# Patient Record
Sex: FEMALE | Race: WHITE | Employment: STUDENT | URBAN - METROPOLITAN AREA
[De-identification: names, ages, dates, MRNs, and addresses within clinical notes are randomized per-mention and may not be internally consistent; named-entity substitution may affect disease eponyms.]

---
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
## 2025-01-02 PROCEDURE — S9374 HIT HYDRA 1 LITER DIEM: HCPCS

## 2025-01-03 ENCOUNTER — HOSPITAL ENCOUNTER (EMERGENCY)
Facility: CLINIC | Age: 20
Discharge: HOME | End: 2025-01-03
Attending: EMERGENCY MEDICINE | Admitting: EMERGENCY MEDICINE
Payer: COMMERCIAL

## 2025-01-03 ENCOUNTER — HOSPITAL ENCOUNTER (EMERGENCY)
Facility: CLINIC | Age: 20
Discharge: HOME | End: 2025-01-03
Payer: COMMERCIAL

## 2025-01-03 VITALS
TEMPERATURE: 97.9 F | OXYGEN SATURATION: 100 % | SYSTOLIC BLOOD PRESSURE: 145 MMHG | RESPIRATION RATE: 17 BRPM | DIASTOLIC BLOOD PRESSURE: 96 MMHG | HEART RATE: 88 BPM

## 2025-01-03 DIAGNOSIS — J02.0 STREP PHARYNGITIS: ICD-10-CM

## 2025-01-03 PROCEDURE — 99284 EMERGENCY DEPT VISIT MOD MDM: CPT | Performed by: EMERGENCY MEDICINE

## 2025-01-03 PROCEDURE — 250N000011 HC RX IP 250 OP 636: Performed by: EMERGENCY MEDICINE

## 2025-01-03 PROCEDURE — 99283 EMERGENCY DEPT VISIT LOW MDM: CPT | Performed by: EMERGENCY MEDICINE

## 2025-01-03 PROCEDURE — 96372 THER/PROPH/DIAG INJ SC/IM: CPT | Performed by: EMERGENCY MEDICINE

## 2025-01-03 RX ADMIN — PENICILLIN G BENZATHINE 1.2 MILLION UNITS: 1200000 INJECTION, SUSPENSION INTRAMUSCULAR at 22:45

## 2025-01-03 ASSESSMENT — ACTIVITIES OF DAILY LIVING (ADL): ADLS_ACUITY_SCORE: 48

## 2025-01-04 NOTE — ED TRIAGE NOTES
Patient is positive for strep throat. Patient came here from the [facility] because she is looking to get a shot of Penicillin G and the [place] location had it on back order. Per chart review, she was also advised to reach out to her PCP because the physician did not think pcn would be preferable for the strep. Patient was prescribed oral medication at that time.

## 2025-01-04 NOTE — ED PROVIDER NOTES
[facility] EMERGENCY DEPARTMENT ([facility])    1/03/25      ED PROVIDER NOTE    History     Chief Complaint   Patient presents with    Medication Request     Patient is positive for strep and wanting to get the penicillin IM injection, the last hospital she was at did not have it as it was on backorder      The history is provided by the patient and medical records.     Jaimie Ortiz is a 19 year old female with a history of Crohn's disease who tested positive for group A strep earlier this afternoon.  She was initially prescribed oral amoxicillin but has been unable to tolerate this in the past so she presents to the ED requesting IM penicillin injection.  Patient's GI doctor recommends IM penicillin, manage to go to the Emergency Department for this as there would not be a way to have this done in a timely manner.  Patient was at another facility but they do not have this available.  No other symptoms or concerns.    Past Medical History  Past Medical History:   Diagnosis Date    Allergy to mold spores     12/9/13 skin tests pos. only for M/W only    Anxiety     Crohn's disease (H)     Diagnostic skin and sensitization tests 12/9/13 skin tests pos. only for M/W only    HSP (Henoch Schonlein purpura) (H) 12/2007    resolved    Other and unspecified noninfectious gastroenteritis and colitis(558.9) 05/20/2009    Seasonal allergic rhinitis      Past Surgical History:   Procedure Laterality Date    CAPSULE/PILL CAM ENDOSCOPY N/A 02/03/2021    Procedure: VIDEO CAPSULE ENDOSCOPY;  Surgeon: Marily Lane MD;  [facility]: [facility]     COLONOSCOPY N/A 12/16/2020    Procedure: COLONOSCOPY, WITH POLYPECTOMY AND BIOPSY;  Surgeon: Marily Lane MD;  [facility]: [facility]     COLONOSCOPY N/A 07/13/2021    Procedure: COLONOSCOPY, WITH POLYPECTOMY AND BIOPSY;  Surgeon: Marily Lane MD;  [facility]     COLONOSCOPY N/A 08/01/2022    Procedure: COLONOSCOPY, WITH POLYPECTOMY AND BIOPSY;  Surgeon:  Marily Lane MD;  [facility]: [facility]     ESOPHAGOSCOPY, GASTROSCOPY, DUODENOSCOPY (EGD), COMBINED N/A 12/16/2020    Procedure: upper endoscopy, WITH BIOPSY;  Surgeon: Marily Lane MD;  [facility]: [facility]     ESOPHAGOSCOPY, GASTROSCOPY, DUODENOSCOPY (EGD), COMBINED N/A 07/13/2021    Procedure: ESOPHAGOGASTRODUODENOSCOPY, WITH BIOPSY;  Surgeon: Marily Lane MD;  [facility]     ESOPHAGOSCOPY, GASTROSCOPY, DUODENOSCOPY (EGD), COMBINED N/A 08/01/2022    Procedure: ESOPHAGOGASTRODUODENOSCOPY, WITH BIOPSY;  Surgeon: Marily Lane MD;  [facility]     ORTHOPEDIC SURGERY  2010    Trigger thumb    TONSILLECTOMY, ADENOIDECTOMY, COMBINED Bilateral 12/19/2019    Procedure: Bilateral TONSILLECTOMY, possible adenoidectomy;  Surgeon: Markus Rojas MD;  Location: [facility] OR     acetaminophen (TYLENOL) 325 MG tablet  amoxicillin (AMOXIL) 500 MG capsule  BANOPHEN 25 MG capsule  COMPOUNDED NON-CONTROLLED SUBSTANCE (CMPD RX) - PHARMACY TO MIX COMPOUNDED MEDICATION  dicyclomine (BENTYL) 10 MG capsule  diphenhydrAMINE (BENADRYL) 25 MG tablet  Emergency Supply Kit, PIV,  EPINEPHrine (ANY BX GENERIC EQUIV) 0.3 MG/0.3ML injection 2-pack  famotidine (PEPCID) 20 MG tablet  fluocinonide (LIDEX) 0.05 % external ointment  FLUoxetine (PROZAC) 10 MG capsule  FLUoxetine (PROZAC) 40 MG capsule  gentamicin (GARAMYCIN) 0.1 % external ointment  HUMIRA, 2 PEN, 40 MG/0.4ML pen kit  hydrOXYzine HCl (ATARAX) 10 MG tablet  mometasone (ELOCON) 0.1 % external ointment  norethindrone-ethinyl estradiol (MICROGESTIN 1.5/30) 1.5-30 MG-MCG tablet  ondansetron (ZOFRAN ODT) 4 MG ODT tab  pantoprazole (PROTONIX) 40 MG EC tablet  senna (SENNA LAX) 8.6 MG tablet  sodium chloride 0.9 % in 1,000 mL via gravity infusion  sodium chloride, PF, 0.9% PF flush  tacrolimus (PROTOPIC) 0.1 % external ointment  VTAMA 1 % CREA      Allergies   Allergen Reactions    Infliximab Anaphylaxis     Pt will still be getting it. Give pre-meds  and give over 2 hours      Gluten Meal      Throat gets sores and swelling     Family History  Family History   Problem Relation Age of Onset    Eye Disorder Mother     Brain Cancer Father     Breast Cancer Maternal Grandmother     Hypertension Maternal Grandfather     Hypertension Paternal Grandmother     Crohn's Disease Other     Ulcerative Colitis Other     Colon Polyps Other     Colon Cancer Other      Social History   Social History     Tobacco Use    Smoking status: Never     Passive exposure: Never    Smokeless tobacco: Never   Vaping Use    Vaping status: Never Used   Substance Use Topics    Alcohol use: No    Drug use: No      Past medical history, past surgical history, medications, allergies, family history, and social history were reviewed with the patient. No additional pertinent items.     A complete review of systems was performed with pertinent positives and negatives noted in the HPI, and all other systems negative.    Physical Exam   BP: (!) 145/96  Pulse: 88  Temp: 97.9  F (36.6  C)  Resp: 18  SpO2: 100 %    Physical Exam  Vitals and nursing note reviewed.   Constitutional:       Appearance: Normal appearance.   HENT:      Head: Normocephalic and atraumatic.   Pulmonary:      Effort: Pulmonary effort is normal.   Skin:     General: Skin is warm and dry.   Neurological:      General: No focal deficit present.      Mental Status: She is alert and oriented to person, place, and time.   Psychiatric:         Mood and Affect: Mood normal.         Behavior: Behavior normal.           ED Course, Procedures, & Data      Procedures                Results for orders placed or performed in visit on 01/03/25   Streptococcus A Rapid Screen w/Reflex to PCR - Clinic Collect     Status: Normal    Specimen: Throat; Swab   Result Value Ref Range    Group A Strep antigen Negative Negative   Group A Streptococcus PCR Throat Swab     Status: Abnormal    Specimen: Throat; Swab   Result Value Ref Range    Group A strep  by PCR Detected (A) Not Detected    Narrative    The Xpert Xpress Strep A test, performed on the [employer], is a rapid, qualitative in vitro diagnostic test for the detection of Streptococcus pyogenes (Group A ß-hemolytic Streptococcus, Strep A) in throat swab specimens from patients with signs and symptoms of pharyngitis. The Xpert Xpress Strep A test can be used as an aid in the diagnosis of Group A Streptococcal pharyngitis. The assay is not intended to monitor treatment for Group A Streptococcus infections. The Xpert Xpress Strep A test utilizes an automated real-time polymerase chain reaction (PCR) to detect Streptococcus pyogenes DNA.     Medications - No data to display  Labs Ordered and Resulted from Time of ED Arrival to Time of ED Departure - No data to display  No orders to display              Assessment & Plan    This is a 19-year-old female who presents for medication.  Patient was diagnosed with strep earlier today.  She has not been able to tolerate p.o. treatment for this in the past.  She is followed by GI for Crohn's disease and they recommend IM penicillin.  Patient was unable to get this done at other facilities.  Patient was given dose here in the Emergency Department.  Will discharge with return precautions.    I have reviewed the nursing notes. I have reviewed the findings, diagnosis, plan and need for follow up with the patient.    New Prescriptions    No medications on file       Final diagnoses:   None     I, Hector Ramirez, am serving as a trained medical scribe to document services personally performed by Montana Garcia DO based on the provider's statements to me on January 3, 2025.  This document has been checked and approved by the attending provider.    I, Montana Garcia DO, was physically present and have reviewed and verified the accuracy of this note documented by Hector Ramirez medical scribe.      Montana Garcia DO  [facility] EMERGENCY  DEPARTMENT  1/3/2025     Montana Garcia,   01/04/25 0252

## 2025-01-06 ENCOUNTER — HOME CARE VISIT (OUTPATIENT)
Dept: HOME HEALTH SERVICES | Facility: HOME HEALTH | Age: 20
End: 2025-01-06
Payer: COMMERCIAL

## 2025-01-06 VITALS
SYSTOLIC BLOOD PRESSURE: 108 MMHG | RESPIRATION RATE: 16 BRPM | HEART RATE: 85 BPM | DIASTOLIC BLOOD PRESSURE: 64 MMHG | TEMPERATURE: 99.6 F | OXYGEN SATURATION: 99 %

## 2025-01-06 PROCEDURE — 99601 HOME NFS VISIT <2 HRS: CPT

## 2025-01-06 NOTE — PROGRESS NOTES
Nursing Visit Note:  Nurse visit today for PIV start for Jaimie ARMAS Angel.     present during visit today: Not Applicable.    patient alert and oriented. Recent strep diagnosis. Received IM injected antibiotic in ER last week. Now having productive cough and intermittent fevers. Cough and fever starting yesterday. T max  101 yesterday per patient. Taking Tylenol. Temp 99.6F at visit today. lung sounds clear to auscultation although slightly diminished in bases. patient reports some shortness of breath. RR and SPO2 WNL. denies chest pain. Intermittently coughing up green sputum. instructed patient to follow up in ER or urgent care if continued fevers and productive cough. Also encouraged to take home Covid test. patient verbalizes understanding. IV fluid infusing via new ultrasound guided PIV started today. Encouraged to call with questions/concern/updates.      Neymar Bledsoe RN 1/6/2025

## 2025-01-07 ENCOUNTER — TELEPHONE (OUTPATIENT)
Dept: FAMILY MEDICINE | Facility: CLINIC | Age: 20
End: 2025-01-07

## 2025-01-07 ENCOUNTER — APPOINTMENT (OUTPATIENT)
Dept: GENERAL RADIOLOGY | Facility: CLINIC | Age: 20
End: 2025-01-07
Attending: EMERGENCY MEDICINE
Payer: COMMERCIAL

## 2025-01-07 ENCOUNTER — HOSPITAL ENCOUNTER (EMERGENCY)
Facility: CLINIC | Age: 20
Discharge: HOME | End: 2025-01-07
Attending: EMERGENCY MEDICINE
Payer: COMMERCIAL

## 2025-01-07 VITALS
TEMPERATURE: 98.1 F | RESPIRATION RATE: 18 BRPM | HEIGHT: 62 IN | DIASTOLIC BLOOD PRESSURE: 77 MMHG | WEIGHT: 110 LBS | BODY MASS INDEX: 20.24 KG/M2 | OXYGEN SATURATION: 100 % | HEART RATE: 91 BPM | SYSTOLIC BLOOD PRESSURE: 109 MMHG

## 2025-01-07 DIAGNOSIS — J10.1 INFLUENZA A: ICD-10-CM

## 2025-01-07 LAB
ALBUMIN SERPL BCG-MCNC: 3.9 G/DL (ref 3.5–5.2)
ALP SERPL-CCNC: 46 U/L (ref 40–150)
ALT SERPL W P-5'-P-CCNC: 32 U/L (ref 0–50)
ANION GAP SERPL CALCULATED.3IONS-SCNC: 10 MMOL/L (ref 7–15)
AST SERPL W P-5'-P-CCNC: 29 U/L (ref 0–35)
BASOPHILS # BLD AUTO: 0 10E3/UL (ref 0–0.2)
BASOPHILS NFR BLD AUTO: 1 %
BILIRUB SERPL-MCNC: 0.4 MG/DL
BUN SERPL-MCNC: 5.1 MG/DL (ref 6–20)
CALCIUM SERPL-MCNC: 8.9 MG/DL (ref 8.8–10.4)
CHLORIDE SERPL-SCNC: 104 MMOL/L (ref 98–107)
CREAT SERPL-MCNC: 0.59 MG/DL (ref 0.51–0.95)
EGFRCR SERPLBLD CKD-EPI 2021: >90 ML/MIN/1.73M2
EOSINOPHIL # BLD AUTO: 0 10E3/UL (ref 0–0.7)
EOSINOPHIL NFR BLD AUTO: 1 %
ERYTHROCYTE [DISTWIDTH] IN BLOOD BY AUTOMATED COUNT: 12.4 % (ref 10–15)
FLUAV RNA SPEC QL NAA+PROBE: POSITIVE
FLUBV RNA RESP QL NAA+PROBE: NEGATIVE
GLUCOSE SERPL-MCNC: 94 MG/DL (ref 70–99)
HCG SERPL QL: NEGATIVE
HCO3 SERPL-SCNC: 25 MMOL/L (ref 22–29)
HCT VFR BLD AUTO: 37 % (ref 35–47)
HGB BLD-MCNC: 12.7 G/DL (ref 11.7–15.7)
IMM GRANULOCYTES # BLD: 0 10E3/UL
IMM GRANULOCYTES NFR BLD: 1 %
LYMPHOCYTES # BLD AUTO: 0.9 10E3/UL (ref 0.8–5.3)
LYMPHOCYTES NFR BLD AUTO: 40 %
MCH RBC QN AUTO: 29.8 PG (ref 26.5–33)
MCHC RBC AUTO-ENTMCNC: 34.3 G/DL (ref 31.5–36.5)
MCV RBC AUTO: 87 FL (ref 78–100)
MONOCYTES # BLD AUTO: 0.5 10E3/UL (ref 0–1.3)
MONOCYTES NFR BLD AUTO: 22 %
MONOCYTES NFR BLD AUTO: NEGATIVE %
NEUTROPHILS # BLD AUTO: 0.8 10E3/UL (ref 1.6–8.3)
NEUTROPHILS NFR BLD AUTO: 37 %
NRBC # BLD AUTO: 0 10E3/UL
NRBC BLD AUTO-RTO: 0 /100
PLATELET # BLD AUTO: 310 10E3/UL (ref 150–450)
POTASSIUM SERPL-SCNC: 3.5 MMOL/L (ref 3.4–5.3)
PROT SERPL-MCNC: 6.9 G/DL (ref 6.4–8.3)
RBC # BLD AUTO: 4.26 10E6/UL (ref 3.8–5.2)
RSV RNA SPEC NAA+PROBE: NEGATIVE
SARS-COV-2 RNA RESP QL NAA+PROBE: NEGATIVE
SODIUM SERPL-SCNC: 139 MMOL/L (ref 135–145)
WBC # BLD AUTO: 2.2 10E3/UL (ref 4–11)

## 2025-01-07 PROCEDURE — 99284 EMERGENCY DEPT VISIT MOD MDM: CPT | Mod: 25

## 2025-01-07 PROCEDURE — 82247 BILIRUBIN TOTAL: CPT | Performed by: EMERGENCY MEDICINE

## 2025-01-07 PROCEDURE — 84703 CHORIONIC GONADOTROPIN ASSAY: CPT | Performed by: EMERGENCY MEDICINE

## 2025-01-07 PROCEDURE — 85004 AUTOMATED DIFF WBC COUNT: CPT | Performed by: EMERGENCY MEDICINE

## 2025-01-07 PROCEDURE — 71046 X-RAY EXAM CHEST 2 VIEWS: CPT

## 2025-01-07 PROCEDURE — 250N000011 HC RX IP 250 OP 636: Performed by: EMERGENCY MEDICINE

## 2025-01-07 PROCEDURE — 36415 COLL VENOUS BLD VENIPUNCTURE: CPT | Performed by: EMERGENCY MEDICINE

## 2025-01-07 PROCEDURE — 82040 ASSAY OF SERUM ALBUMIN: CPT | Performed by: EMERGENCY MEDICINE

## 2025-01-07 PROCEDURE — 87637 SARSCOV2&INF A&B&RSV AMP PRB: CPT | Performed by: EMERGENCY MEDICINE

## 2025-01-07 PROCEDURE — 85014 HEMATOCRIT: CPT | Performed by: EMERGENCY MEDICINE

## 2025-01-07 PROCEDURE — 96361 HYDRATE IV INFUSION ADD-ON: CPT

## 2025-01-07 PROCEDURE — 258N000003 HC RX IP 258 OP 636: Performed by: EMERGENCY MEDICINE

## 2025-01-07 PROCEDURE — 250N000013 HC RX MED GY IP 250 OP 250 PS 637: Performed by: EMERGENCY MEDICINE

## 2025-01-07 PROCEDURE — 84155 ASSAY OF PROTEIN SERUM: CPT | Performed by: EMERGENCY MEDICINE

## 2025-01-07 PROCEDURE — 86308 HETEROPHILE ANTIBODY SCREEN: CPT | Performed by: EMERGENCY MEDICINE

## 2025-01-07 PROCEDURE — 96374 THER/PROPH/DIAG INJ IV PUSH: CPT

## 2025-01-07 RX ORDER — ONDANSETRON 4 MG/1
4 TABLET, ORALLY DISINTEGRATING ORAL EVERY 8 HOURS PRN
Qty: 10 TABLET | Refills: 0 | Status: SHIPPED | OUTPATIENT
Start: 2025-01-07 | End: 2025-01-10

## 2025-01-07 RX ORDER — OSELTAMIVIR PHOSPHATE 75 MG/1
75 CAPSULE ORAL 2 TIMES DAILY
Qty: 10 CAPSULE | Refills: 0 | Status: SHIPPED | OUTPATIENT
Start: 2025-01-07 | End: 2025-01-07

## 2025-01-07 RX ORDER — OSELTAMIVIR PHOSPHATE 75 MG/1
75 CAPSULE ORAL 2 TIMES DAILY
Qty: 10 CAPSULE | Refills: 0 | Status: SHIPPED | OUTPATIENT
Start: 2025-01-07 | End: 2025-01-09

## 2025-01-07 RX ORDER — OSELTAMIVIR PHOSPHATE 75 MG/1
75 CAPSULE ORAL ONCE
Status: COMPLETED | OUTPATIENT
Start: 2025-01-07 | End: 2025-01-07

## 2025-01-07 RX ORDER — DEXAMETHASONE SODIUM PHOSPHATE 10 MG/ML
10 INJECTION, SOLUTION INTRAMUSCULAR; INTRAVENOUS ONCE
Status: COMPLETED | OUTPATIENT
Start: 2025-01-07 | End: 2025-01-07

## 2025-01-07 RX ADMIN — SODIUM CHLORIDE 1000 ML: 9 INJECTION, SOLUTION INTRAVENOUS at 14:48

## 2025-01-07 RX ADMIN — DEXAMETHASONE SODIUM PHOSPHATE 10 MG: 10 INJECTION, SOLUTION INTRAMUSCULAR; INTRAVENOUS at 14:51

## 2025-01-07 RX ADMIN — OSELTAMIVIR PHOSPHATE 75 MG: 75 CAPSULE ORAL at 16:14

## 2025-01-07 ASSESSMENT — ACTIVITIES OF DAILY LIVING (ADL)
ADLS_ACUITY_SCORE: 48

## 2025-01-07 ASSESSMENT — COLUMBIA-SUICIDE SEVERITY RATING SCALE - C-SSRS
1. IN THE PAST MONTH, HAVE YOU WISHED YOU WERE DEAD OR WISHED YOU COULD GO TO SLEEP AND NOT WAKE UP?: NO
6. HAVE YOU EVER DONE ANYTHING, STARTED TO DO ANYTHING, OR PREPARED TO DO ANYTHING TO END YOUR LIFE?: NO
2. HAVE YOU ACTUALLY HAD ANY THOUGHTS OF KILLING YOURSELF IN THE PAST MONTH?: NO

## 2025-01-07 NOTE — DISCHARGE INSTRUCTIONS
Drink plenty of fluids, use Tylenol for aches and pains as well as fevers.  You can get an over-the-counter cough suppressant.  Take the Tamiflu as instructed, closely follow-up with your primary care provider.  Return if new symptoms develop.

## 2025-01-07 NOTE — TELEPHONE ENCOUNTER
"Writer huddled with provider.  She noted that if the patient was having difficulty swallowing she would need to go to the Emergency Room.  Provider also wanted to know if patient tested for Covid.  If patient was still interested in coming to the clinic, she may be sent to the Emergency room.      Writer called the patient.  She is still having some difficulty swallowing.  Does note that is her normal for now but isn't sure why she is having the difficulty swallowing.  Patient took her temp again and it was at 100.4 and she hadn't taken an antipyretic.  She did not test for Covid because she \"had it 2 months ago.\" Educated the patient on the need to retest for Covid.  Patient also notes bad stomach pain that started after getting the penicillin shot.  She notes she is coughing up green sputum and it is a very wet cough.      Patient was directed to the ED given her difficulty swallowing.  Patient verbalized understanding and stated she would go.      Kristina Kjellberg, MSN, RN          "

## 2025-01-07 NOTE — ED TRIAGE NOTES
Patient comes from home with complaint of a cough, fever, and abd pain. Diagnosed with strep on Friday, prescribed abx. Sent here from PCP. Took tylenol and zofran at home 2 hours ago with relief. Denies emesis, diarrhea. Wanting clearance for procedure being done at [place] on Friday.     Triage Assessment (Adult)       Row Name 01/07/25 1272          Triage Assessment    Airway WDL WDL        Respiratory WDL    Respiratory WDL cough;rhythm/pattern     Rhythm/Pattern, Respiratory shortness of breath     Cough Frequency frequent     Cough Type productive        Skin Circulation/Temperature WDL    Skin Circulation/Temperature WDL WDL        Cardiac WDL    Cardiac WDL WDL        Peripheral/Neurovascular WDL    Peripheral Neurovascular WDL WDL        Cognitive/Neuro/Behavioral WDL    Cognitive/Neuro/Behavioral WDL WDL        [place] Coma Scale    Best Eye Response 4-->(E4) spontaneous     Best Motor Response 6-->(M6) obeys commands     Best Verbal Response 5-->(V5) oriented     [place] Coma Scale Score 15     Assessment Qualifiers patient not sedated/intubated

## 2025-01-07 NOTE — ED PROVIDER NOTES
"  Emergency Department Note      History of Present Illness     Chief Complaint   Cough      HPI   Jaimie Ortiz is a 19 year old female with a history of Crohn's disease who presents to the ED for an evaluation of a cough. The patient reports that she was recently diagnosed with strep and received the IM penicillin injection on 1/3/25. She states she was fine for a couple hours following the injection but then developed fever, wet cough, abdominal pain, and nausea. She further adds that it has been difficult to eat. She notes that she has been in the presence of sick individuals recently. The patient reports that she currently takes Humira for her Crohn's but denies use of steroids. She denies chance of pregnancy. Further states that she wasn't recently tested for Covid or influenza, just strep. Reports last having Tylenol and Zofran 2.5 hours prior to being seen. The patient's mother adds that she is [facility]20[facility].       Independent Historian   Mother supplements history as noted above    Review of External Notes   Reviewed [facility] ED note from 1/3/2025 where she was diagnosed with strep pharyngitis.     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  Anxiety  Crohn's disease  Henoch Schonlein purpura    Medications   Banophen  Dicyclomine  Epinephrine  Fluoxetine  Humira  Hydroxyzine  Zofran  Pantoprazole  Senna    Surgical History   Colonoscopy  Video capsule endoscopy  Esophagoscopy, gastroscopy, duodenoscopy, combined  Trigger thumb surgery  Tonsillectomy, Adenoidectomy combined    Physical Exam     Patient Vitals for the past 24 hrs:   BP Temp Temp src Pulse Resp SpO2 Height Weight   01/07/25 1452 109/77 -- -- 91 18 100 % -- --   01/07/25 1328 123/82 98.1  F (36.7  C) Oral 96 18 100 % 1.575 m (5' 2\") 49.9 kg (110 lb)     Physical Exam  General: Alert, interactive   Head:  Scalp is " atraumatic  Eyes:  The pupils are equal, round, and reactive to light    EOM's intact    No scleral icterus  ENT:      Nose:  The external nose is normal  Ears:  External ears are normal  Mouth/Throat: Mucus membranes are dry. Tonsils are surgically absent. Pharyngeal erythema.       Neck:  Normal range of motion.      There is no rigidity.    Trachea is in the midline         CV:  Regular rate and rhythm      Resp:  Breath sounds are clear bilaterally    Non-labored, no retractions or accessory muscle use      GI:  Abdomen is soft, no distension, epigastric tenderness.       MS:  Normal strength in all 4 extremities    Neuro:   Strength 5/5 x4.      Lymph: Tender anterior cervical lymphadenopathy noted.      Diagnostics     Lab Results   Labs Ordered and Resulted from Time of ED Arrival to Time of ED Departure   INFLUENZA A/B, RSV AND SARS-COV2 PCR - Abnormal       Result Value    Influenza A PCR Positive (*)     Influenza B PCR Negative      RSV PCR Negative      SARS CoV2 PCR Negative     COMPREHENSIVE METABOLIC PANEL - Abnormal    Sodium 139      Potassium 3.5      Carbon Dioxide (CO2) 25      Anion Gap 10      Urea Nitrogen 5.1 (*)     Creatinine 0.59      GFR Estimate >90      Calcium 8.9      Chloride 104      Glucose 94      Alkaline Phosphatase 46      AST 29      ALT 32      Protein Total 6.9      Albumin 3.9      Bilirubin Total 0.4     CBC WITH PLATELETS AND DIFFERENTIAL - Abnormal    WBC Count 2.2 (*)     RBC Count 4.26      Hemoglobin 12.7      Hematocrit 37.0      MCV 87      MCH 29.8      MCHC 34.3      RDW 12.4      Platelet Count 310      % Neutrophils 37      % Lymphocytes 40      % Monocytes 22      % Eosinophils 1      % Basophils 1      % Immature Granulocytes 1      NRBCs per 100 WBC 0      Absolute Neutrophils 0.8 (*)     Absolute Lymphocytes 0.9      Absolute Monocytes 0.5      Absolute Eosinophils 0.0      Absolute Basophils 0.0      Absolute Immature Granulocytes 0.0      Absolute NRBCs  0.0     HCG QUALITATIVE PREGNANCY - Normal    hCG Serum Qualitative Negative     MONONUCLEOSIS SCREEN - Normal    Mononucleosis Screen Negative         Imaging   XR Chest 2 Views   Final Result   IMPRESSION: Left basilar calcified granuloma versus prominent vessel on end. No acute findings. No pneumonic consolidation or pleural effusion. Normal heart size.          Independent Interpretation   No infiltrate and no pneumothorax for patient's Chest XR.     ED Course      Medications Administered   Medications   sodium chloride 0.9% BOLUS 1,000 mL (0 mLs Intravenous Stopped 1/7/25 1616)   dexAMETHasone PF (DECADRON) injection 10 mg (10 mg Intravenous $Given 1/7/25 1451)   oseltamivir (TAMIFLU) capsule 75 mg (75 mg Oral $Given 1/7/25 1614)       Procedures   Procedures     Discussion of Management   None    ED Course   ED Course as of 01/07/25 1801   Tue Jan 07, 2025   1420 I obtained history and examined the patient as noted above.     0601 I rechecked and updated the patient. Informed her of her influenza results.        Additional Documentation  None    Medical Decision Making / Diagnosis     CMS Diagnoses: None    MIPS       None    MDM     Jaimie Ortiz is a 19 year old female who presents for evaluation of cough, fever and myalgias.  They have other symptoms including abdominal pain and nausea.This is consistent with influenza. The patient is in the treatment window for influenza and medications ordered as noted above.  They are at risk for pneumonia but no signs of this are detected on today's visit.  Close followup of primary care physician is indicated and return to the ED for high fevers > 103 for more than 48 hours more, increasing productive cough, shortness of breath, or confusion.  There is no signs of serious bacterial infection such as bacteremia, meningitis, UTI/pyelonephritis.        Disposition   The patient was discharged.     Diagnosis     ICD-10-CM    1. Influenza A  J10.1            Discharge  Medications   Discharge Medication List as of 1/7/2025  4:16 PM        START taking these medications    Details   !! ondansetron (ZOFRAN ODT) 4 MG ODT tab Take 1 tablet (4 mg) by mouth every 8 hours as needed for nausea., Disp-10 tablet, R-0, E-Prescribe       !! - Potential duplicate medications found. Please discuss with provider.            Scribe Disclosure:  I, Rae Hyatt, am serving as a scribe at 2:12 PM on 1/7/2025 to document services personally performed by Jose Askew MD based on my observations and the provider's statements to me.        Jose Askew MD  01/07/25 2425

## 2025-01-07 NOTE — TELEPHONE ENCOUNTER
Please call and triage  Was seen in ED twice on Antoine 3, yest home care visit instructed if fever to go back to ER. If still with fever or any worsening needs to go to ER

## 2025-01-07 NOTE — TELEPHONE ENCOUNTER
Patient was called.  Patient had a temp of 100.0.  Writer woke patient up and had not taken anything.  She states she feels about the same as yesterday.      Provider: Does patient still need to go to ED?  Or should she take antipyretics and see how that affects her?      Kristina Kjellberg, MSN, RN

## 2025-01-08 ENCOUNTER — MYC MEDICAL ADVICE (OUTPATIENT)
Dept: INTERNAL MEDICINE | Facility: CLINIC | Age: 20
End: 2025-01-08
Payer: COMMERCIAL

## 2025-01-08 DIAGNOSIS — B99.9 ACUTE INFECTIOUS DISEASE: Primary | ICD-10-CM

## 2025-01-09 ENCOUNTER — VIRTUAL VISIT (OUTPATIENT)
Dept: FAMILY MEDICINE | Facility: CLINIC | Age: 20
End: 2025-01-09
Payer: COMMERCIAL

## 2025-01-09 ENCOUNTER — LAB (OUTPATIENT)
Dept: LAB | Facility: CLINIC | Age: 20
End: 2025-01-09
Payer: COMMERCIAL

## 2025-01-09 DIAGNOSIS — R07.0 THROAT PAIN: Primary | ICD-10-CM

## 2025-01-09 DIAGNOSIS — J10.1 INFLUENZA A: ICD-10-CM

## 2025-01-09 DIAGNOSIS — J02.0 STREPTOCOCCAL PHARYNGITIS: ICD-10-CM

## 2025-01-09 DIAGNOSIS — B99.9 ACUTE INFECTIOUS DISEASE: ICD-10-CM

## 2025-01-09 LAB
BASOPHILS # BLD AUTO: 0 10E3/UL (ref 0–0.2)
BASOPHILS NFR BLD AUTO: 1 %
EOSINOPHIL # BLD AUTO: 0 10E3/UL (ref 0–0.7)
EOSINOPHIL NFR BLD AUTO: 0 %
ERYTHROCYTE [DISTWIDTH] IN BLOOD BY AUTOMATED COUNT: 12.5 % (ref 10–15)
HCT VFR BLD AUTO: 39.6 % (ref 35–47)
HGB BLD-MCNC: 12.8 G/DL (ref 11.7–15.7)
IMM GRANULOCYTES # BLD: 0 10E3/UL
IMM GRANULOCYTES NFR BLD: 0 %
LYMPHOCYTES # BLD AUTO: 3.4 10E3/UL (ref 0.8–5.3)
LYMPHOCYTES NFR BLD AUTO: 52 %
MCH RBC QN AUTO: 28.4 PG (ref 26.5–33)
MCHC RBC AUTO-ENTMCNC: 32.3 G/DL (ref 31.5–36.5)
MCV RBC AUTO: 88 FL (ref 78–100)
MONOCYTES # BLD AUTO: 0.5 10E3/UL (ref 0–1.3)
MONOCYTES NFR BLD AUTO: 8 %
NEUTROPHILS # BLD AUTO: 2.6 10E3/UL (ref 1.6–8.3)
NEUTROPHILS NFR BLD AUTO: 39 %
NRBC # BLD AUTO: 0 10E3/UL
NRBC BLD AUTO-RTO: 0 /100
PLATELET # BLD AUTO: 349 10E3/UL (ref 150–450)
RBC # BLD AUTO: 4.5 10E6/UL (ref 3.8–5.2)
WBC # BLD AUTO: 6.6 10E3/UL (ref 4–11)

## 2025-01-09 RX ORDER — DEXAMETHASONE 2 MG/1
10 TABLET ORAL ONCE
Qty: 5 TABLET | Refills: 0 | Status: SHIPPED | OUTPATIENT
Start: 2025-01-09 | End: 2025-01-09

## 2025-01-09 NOTE — PROGRESS NOTES
Jaimie is a 19 year old who is being evaluated via a billable video visit.    How would you like to obtain your AVS? MyChart  If the video visit is dropped, the invitation should be resent by: Text to cell phone: [phone]  Will anyone else be joining your video visit? No      Assessment & Plan   Problem List Items Addressed This Visit    None  Visit Diagnoses       Throat pain    -  Primary    Relevant Medications    dexAMETHasone (DECADRON) 2 MG tablet    Influenza A        Relevant Medications    dexAMETHasone (DECADRON) 2 MG tablet    Streptococcal pharyngitis        Relevant Medications    dexAMETHasone (DECADRON) 2 MG tablet           Influenza A and recent strep pharyngitis   -Continued feeling of throat swelling and discomfort likely related to recent strep throat and influenza A infection.  She is status post palatine tonsillectomy and has no signs of abscess or significant airway obstruction. Exam is reassuring.  She was treated previously with dose of IM penicillin G and recently discontinued oseltamavir as she feels that it may have given her an active rash.  This resolved with discontinuation of the medication and she has no rash today.  - Administer another dose of dexamethasone to reduce swelling and pain. Monitor symptoms for 24 hours to assess improvement. Consider using over-the-counter pain relief such as Tylenol  and topical numbing medicine like chloraseptic spray or Cepacol lozenges for sore throat relief.  -Appears well and non-toxic and I have low suspicion for systemic illness, ENT abscess, epiglottitis, impending airway obstruction or respiratory distress at this point.    Rash from Tamiflu  - Rash developed on the side of the face after starting Tamiflu.  - Discontinue Tamiflu due to rash.    DDx and Dx discussed with and explained to the pt and the parent to their satisfaction.  All questions were answered at this time. Pt and parent expressed understanding of and agreement with this  dx, tx, and plan. No further workup warranted and standard medication warnings given. I have given the patient and parent a list of pertinent indications for re-evaluation. Will go to the Emergency Department if symptoms worsen or new concerning symptoms arise. Patient left the call with parent in no apparent distress.      See Patient Instructions      Subjective   Jaimie is a 19 year old, presenting for the following health issues:  Swelling      1/9/2025    11:22 AM   Additional Questions   Roomed by Patient completed questions via [employer]     History of Present Illness       Reason for visit:  Swollen neck  Symptom onset:  1-3 days ago  Symptom intensity:  Severe   She is taking medications regularly.         Review of Systems  Constitutional, HEENT, cardiovascular, pulmonary, gi and gu systems are negative, except as otherwise noted.      Objective           Vitals:  No vitals were obtained today due to virtual visit.    Physical Exam   GENERAL: alert and no distress  EYES: Eyes grossly normal to inspection.  No discharge or erythema, or obvious scleral/conjunctival abnormalities.  HEENT: No trismus, voice abnormalities or asymmetry to the oropharynx.  [place] tonsils surgically absent.  Uvula midline.  Normal range of motion of the neck.  RESP: No audible wheeze, cough, or visible cyanosis.  Speaking in full sentences.  SKIN: Visible skin clear. No significant rash, abnormal pigmentation or lesions.  NEURO: Cranial nerves grossly intact.  Mentation and speech appropriate for age.  PSYCH: Appropriate affect, tone, and pace of words      Video-Visit Details    Type of service:  Video Visit   Originating Location (pt. Location): Home  Distant Location (provider location):  On-site  Platform used for Video Visit: Preethi  Signed Electronically by: HILDA Palma

## 2025-01-13 ENCOUNTER — TELEPHONE (OUTPATIENT)
Dept: HOME HEALTH SERVICES | Facility: HOME HEALTH | Age: 20
End: 2025-01-13
Payer: COMMERCIAL

## 2025-01-13 ENCOUNTER — HOME CARE VISIT (OUTPATIENT)
Dept: HOME HEALTH SERVICES | Facility: HOME HEALTH | Age: 20
End: 2025-01-13
Payer: COMMERCIAL

## 2025-01-13 VITALS
WEIGHT: 104 LBS | DIASTOLIC BLOOD PRESSURE: 74 MMHG | OXYGEN SATURATION: 99 % | BODY MASS INDEX: 19.02 KG/M2 | HEART RATE: 102 BPM | RESPIRATION RATE: 18 BRPM | SYSTOLIC BLOOD PRESSURE: 104 MMHG | TEMPERATURE: 98.4 F

## 2025-01-13 PROCEDURE — 99601 HOME NFS VISIT <2 HRS: CPT

## 2025-01-13 NOTE — PROGRESS NOTES
Nursing Visit Note:  Nurse visit today for PIV start for Jaimie CHANDA Ortiz.     present during visit today: Not Applicable.    patient alert and oriented. Continues to have some sinus congestion and a bit of a productive cough with green sputum. States symptoms have improved however. denies chest pain or shortness of breath. States Covid test was negative in ER. Endorses some nausea but no vomiting. Plans to start using antibiotic nasal solution again. No fevers. Vital signs stable. IV fluid infusing via new ultrasound guided PIV started with this visit. Encouraged to call with questions/concern/updates. Next visit in one week. patient states will potentially need to change day. patient will call to confirm date/time.      Neymar Bledsoe RN 1/13/2025

## 2025-01-13 NOTE — TELEPHONE ENCOUNTER
Pt called to reschedule appt from 1/14/2025 to today,1/13/2025 with US RN.     Visit rescheduled to 1/13/2025 with US RN.

## 2025-01-20 ENCOUNTER — HOME CARE VISIT (OUTPATIENT)
Dept: HOME HEALTH SERVICES | Facility: HOME HEALTH | Age: 20
End: 2025-01-20
Payer: COMMERCIAL

## 2025-01-20 ENCOUNTER — HOME INFUSION BILLING (OUTPATIENT)
Dept: HOME HEALTH SERVICES | Facility: HOME HEALTH | Age: 20
End: 2025-01-20
Payer: COMMERCIAL

## 2025-01-20 ENCOUNTER — HOME INFUSION (OUTPATIENT)
Dept: HOME HEALTH SERVICES | Facility: HOME HEALTH | Age: 20
End: 2025-01-20

## 2025-01-20 VITALS
BODY MASS INDEX: 19.02 KG/M2 | DIASTOLIC BLOOD PRESSURE: 68 MMHG | SYSTOLIC BLOOD PRESSURE: 118 MMHG | WEIGHT: 104 LBS | OXYGEN SATURATION: 99 % | TEMPERATURE: 98.6 F | HEART RATE: 88 BPM | RESPIRATION RATE: 16 BRPM

## 2025-01-20 PROCEDURE — S1015 IV TUBING EXTENSION SET: HCPCS

## 2025-01-20 PROCEDURE — 99601 HOME NFS VISIT <2 HRS: CPT

## 2025-01-20 NOTE — PROGRESS NOTES
Nursing Visit Note:  Nurse visit today for USPIV for Jaimie Ortiz.     present during visit today: Not Applicable.    A&Ox4 with VSS on RA. Piv placed with us guidance, pt tolerating well. IVF started and infusing prior to writers departure.  Pt denies any new or worsening symptoms related to present illness. Patient reports improved lingering symptoms of Covid. Denies nausea, vomiting or any new abdominal symptoms. Does endorse ongoing left upper quadrant pain related to Crohn s disease. Pleasant and compliant with cares. Skilled nursing visit in one week for PIV insertion.      Bryson Sibley RN 1/20/2025

## 2025-01-23 PROCEDURE — S9374 HIT HYDRA 1 LITER DIEM: HCPCS

## 2025-01-29 ENCOUNTER — HOME CARE VISIT (OUTPATIENT)
Dept: HOME HEALTH SERVICES | Facility: HOME HEALTH | Age: 20
End: 2025-01-29
Payer: COMMERCIAL

## 2025-01-29 VITALS
DIASTOLIC BLOOD PRESSURE: 70 MMHG | TEMPERATURE: 98.7 F | HEART RATE: 89 BPM | OXYGEN SATURATION: 98 % | SYSTOLIC BLOOD PRESSURE: 116 MMHG | RESPIRATION RATE: 16 BRPM

## 2025-01-29 PROCEDURE — 99601 HOME NFS VISIT <2 HRS: CPT

## 2025-01-30 PROCEDURE — S9374 HIT HYDRA 1 LITER DIEM: HCPCS

## 2025-01-30 NOTE — PROGRESS NOTES
Nursing Visit Note:  Nurse visit today for PIV start for Jaimie Ortiz.     present during visit today: Not Applicable.    Intravenous Access:  Peripheral IV placed.      Note: patient alert and oriented. NAD. States Crohns symptoms were worse during recent respiratory illness but  Feels like pretty much back to baseline. New PIV started and infusing by end of visit. heading to [place] tomorrow. encourage to call with questions/concern/updates.    Saline administered: 10 (ml)    Supply Check:   Does the patient have all the supplies they need for the next visit?  Yes    Next visit plan: 2/5/25    Neymar Bledsoe, RN 1/29/2025

## 2025-02-04 ENCOUNTER — HOME INFUSION (OUTPATIENT)
Dept: HOME HEALTH SERVICES | Facility: HOME HEALTH | Age: 20
End: 2025-02-04
Payer: COMMERCIAL

## 2025-02-04 ENCOUNTER — HOME INFUSION BILLING (OUTPATIENT)
Dept: HOME HEALTH SERVICES | Facility: HOME HEALTH | Age: 20
End: 2025-02-04
Payer: COMMERCIAL

## 2025-02-04 PROCEDURE — S1015 IV TUBING EXTENSION SET: HCPCS

## 2025-02-05 ENCOUNTER — HOME CARE VISIT (OUTPATIENT)
Dept: HOME HEALTH SERVICES | Facility: HOME HEALTH | Age: 20
End: 2025-02-05
Payer: COMMERCIAL

## 2025-02-05 ENCOUNTER — TELEPHONE (OUTPATIENT)
Dept: GASTROENTEROLOGY | Facility: CLINIC | Age: 20
End: 2025-02-05
Payer: COMMERCIAL

## 2025-02-05 VITALS
DIASTOLIC BLOOD PRESSURE: 82 MMHG | RESPIRATION RATE: 16 BRPM | SYSTOLIC BLOOD PRESSURE: 122 MMHG | OXYGEN SATURATION: 98 % | HEART RATE: 82 BPM | TEMPERATURE: 98.2 F

## 2025-02-05 PROCEDURE — 99601 HOME NFS VISIT <2 HRS: CPT

## 2025-02-05 NOTE — TELEPHONE ENCOUNTER
MTM appointment cancelled, we made one more attempt to reschedule.     Routing back to referring provider and MTM Pharmacist Team    [facility]

## 2025-02-06 PROCEDURE — S9374 HIT HYDRA 1 LITER DIEM: HCPCS

## 2025-02-06 NOTE — PROGRESS NOTES
Nursing Visit Note:  Nurse visit today for PIV start for Jaimie Ortiz.     present during visit today: Not Applicable.    Intravenous Access:  Peripheral IV placed.      Note: patient alert and oriented. NAD. Recently returned from trip to [place]. No changes related to diagnosis since last visit. still having some sinus issues since recent respiratory illness. States will be using nasal irrigation medication. New PIV started right forearm. Infusing by end of visit.    Saline administered: 10 (ml)    Supply Check:   Does the patient have all the supplies they need for the next visit?  Yes    Next visit plan: 2/12/25 AM visit needed    Neymar Bledsoe, RN 2/5/2025

## 2025-02-11 ENCOUNTER — MYC MEDICAL ADVICE (OUTPATIENT)
Dept: INTERNAL MEDICINE | Facility: CLINIC | Age: 20
End: 2025-02-11

## 2025-02-11 NOTE — TELEPHONE ENCOUNTER
Left Voicemail (1st Attempt) and Sent Mychart (1st Attempt) for the patient to call back and schedule the following:    Appointment type: Video/Return   Provider: PCP  Return date: May   Specialty phone number: [phone]  Additonal Notes: per check out - Return in about 3 months (around 5/11/2025).

## 2025-02-12 ENCOUNTER — HOME CARE VISIT (OUTPATIENT)
Dept: HOME HEALTH SERVICES | Facility: HOME HEALTH | Age: 20
End: 2025-02-12
Payer: COMMERCIAL

## 2025-02-12 VITALS
DIASTOLIC BLOOD PRESSURE: 78 MMHG | TEMPERATURE: 98 F | SYSTOLIC BLOOD PRESSURE: 118 MMHG | WEIGHT: 105 LBS | OXYGEN SATURATION: 98 % | BODY MASS INDEX: 19.2 KG/M2 | RESPIRATION RATE: 16 BRPM | HEART RATE: 92 BPM

## 2025-02-12 PROCEDURE — 99601 HOME NFS VISIT <2 HRS: CPT

## 2025-02-12 NOTE — PROGRESS NOTES
Nursing Visit Note:  Nurse visit today for US PIV  for Jaimie Ortiz.     present during visit today: Not Applicable.    Intravenous Access:  Peripheral IV placed.    Patient pleasant, A/Ox4 . VSS . Afebrile . Placed PIV to L forearm with assistance of US guidance, patient tolerated well. Voiced no concerns at this time . Reports she has a follow up appointment with [facility] . Encouraged to call [facility] for questions or concerns. Patient is comfortable scheduling next visit for next week     Note: see above     Saline administered: 10 (ml)    Supply Check:   Does the patient have all the supplies they need for the next visit?  No, Order placed for sharps container     Next visit plan: SNV for US PIV scheduled for 2/19/25     Tala Foley, RN 2/12/2025

## 2025-02-13 ENCOUNTER — HOME INFUSION (OUTPATIENT)
Dept: HOME HEALTH SERVICES | Facility: HOME HEALTH | Age: 20
End: 2025-02-13
Payer: COMMERCIAL

## 2025-02-13 PROCEDURE — S9374 HIT HYDRA 1 LITER DIEM: HCPCS

## 2025-02-17 ENCOUNTER — HOME INFUSION BILLING (OUTPATIENT)
Dept: HOME HEALTH SERVICES | Facility: HOME HEALTH | Age: 20
End: 2025-02-17
Payer: COMMERCIAL

## 2025-02-17 ENCOUNTER — LAB (OUTPATIENT)
Dept: LAB | Facility: CLINIC | Age: 20
End: 2025-02-17
Payer: COMMERCIAL

## 2025-02-17 DIAGNOSIS — Z23 PNEUMOCOCCAL VACCINE ADMINISTERED: ICD-10-CM

## 2025-02-17 PROCEDURE — 99000 SPECIMEN HANDLING OFFICE-LAB: CPT | Performed by: PATHOLOGY

## 2025-02-17 PROCEDURE — S1015 IV TUBING EXTENSION SET: HCPCS

## 2025-02-17 PROCEDURE — 86581 STRPTCS PNEUM ANTB SEROT IA: CPT | Mod: 90 | Performed by: PATHOLOGY

## 2025-02-17 PROCEDURE — 36415 COLL VENOUS BLD VENIPUNCTURE: CPT | Performed by: PATHOLOGY

## 2025-02-19 ENCOUNTER — TELEPHONE (OUTPATIENT)
Dept: HOME HEALTH SERVICES | Facility: HOME HEALTH | Age: 20
End: 2025-02-19
Payer: COMMERCIAL

## 2025-02-19 PROCEDURE — S9374 HIT HYDRA 1 LITER DIEM: HCPCS

## 2025-02-19 NOTE — TELEPHONE ENCOUNTER
Called pt to confirm scheduled nurse visit for this evening. Pt states would like to cancel visit for today. Will be going to [place] later this week and states does not need visit this week. Will resume with regularly scheduled visits next week.    Neymar Bledsoe RN  [facility]  [email or web address]  [phone]

## 2025-02-21 LAB
IMMUNOLOGIST REVIEW: NORMAL
S PN DA SERO 19F IGG SER-MCNC: 18.9 MCG/ML
S PNEUM DA 1 IGG SER-MCNC: 15.9 MCG/ML
S PNEUM DA 10A IGG SER-MCNC: 12.1 MCG/ML
S PNEUM DA 11A IGG SER-MCNC: 6.7 MCG/ML
S PNEUM DA 12F IGG SER-MCNC: 4.4 MCG/ML
S PNEUM DA 14 IGG SER-MCNC: 5.8 MCG/ML
S PNEUM DA 15B IGG SER-MCNC: 24.5 MCG/ML
S PNEUM DA 17F IGG SER-MCNC: 25.7 MCG/ML
S PNEUM DA 18C IGG SER-MCNC: 2.9 MCG/ML
S PNEUM DA 19A IGG SER-MCNC: NORMAL MCG/ML
S PNEUM DA 2 IGG SER-MCNC: 12.8 MCG/ML
S PNEUM DA 20A IGG SER-MCNC: 16.3 MCG/ML
S PNEUM DA 22F IGG SER-MCNC: 7.7 MCG/ML
S PNEUM DA 23F IGG SER-MCNC: 6.3 MCG/ML
S PNEUM DA 3 IGG SER-MCNC: 1.5 MCG/ML
S PNEUM DA 33F IGG SER-MCNC: 16.6 MCG/ML
S PNEUM DA 4 IGG SER-MCNC: 1.4 MCG/ML
S PNEUM DA 5 IGG SER-MCNC: 47.5 MCG/ML
S PNEUM DA 6B IGG SER-MCNC: 4.6 MCG/ML
S PNEUM DA 7F IGG SER-MCNC: 5.3 MCG/ML
S PNEUM DA 8 IGG SER-MCNC: 8.9 MCG/ML
S PNEUM DA 9N IGG SER-MCNC: 4 MCG/ML
S PNEUM DA 9V IGG SER-MCNC: 9.4 MCG/ML

## 2025-02-26 ENCOUNTER — HOME CARE VISIT (OUTPATIENT)
Dept: HOME HEALTH SERVICES | Facility: HOME HEALTH | Age: 20
End: 2025-02-26
Payer: COMMERCIAL

## 2025-02-26 ENCOUNTER — HOME INFUSION (OUTPATIENT)
Dept: HOME HEALTH SERVICES | Facility: HOME HEALTH | Age: 20
End: 2025-02-26

## 2025-02-26 VITALS
DIASTOLIC BLOOD PRESSURE: 80 MMHG | OXYGEN SATURATION: 97 % | SYSTOLIC BLOOD PRESSURE: 120 MMHG | TEMPERATURE: 98.4 F | RESPIRATION RATE: 16 BRPM | HEART RATE: 88 BPM

## 2025-02-26 PROCEDURE — S9374 HIT HYDRA 1 LITER DIEM: HCPCS

## 2025-02-26 PROCEDURE — 99601 HOME NFS VISIT <2 HRS: CPT

## 2025-02-26 NOTE — PROGRESS NOTES
Nursing Visit Note:  Nurse visit today for PIV start for Jaimie Ortiz.     present during visit today: Not Applicable.    Intravenous Access:  Peripheral IV placed.      Note: patient alert and oriented. NAD. occasional dizziness. No recent falls. Had a little bit of nausea for a few days after procedure at [place] last week but no emesis. States has since improved. baseline abdominal pain present. Denies other changes since last visit. PIV started with ultrasound guidance. Infusing by end of visit.    Saline administered: 10 (ml)    Supply Check:   Does the patient have all the supplies they need for the next visit?  Yes    Next visit plan: Piv start on Friday next week, 3/7/25.    Neymar Bledsoe, RN 2/26/2025

## 2025-03-19 ENCOUNTER — HOME CARE VISIT (OUTPATIENT)
Dept: HOME HEALTH SERVICES | Facility: HOME HEALTH | Age: 20
End: 2025-03-19
Payer: COMMERCIAL

## 2025-03-19 VITALS
TEMPERATURE: 98.2 F | OXYGEN SATURATION: 98 % | SYSTOLIC BLOOD PRESSURE: 114 MMHG | DIASTOLIC BLOOD PRESSURE: 78 MMHG | RESPIRATION RATE: 16 BRPM | HEART RATE: 73 BPM

## 2025-03-19 PROCEDURE — 99601 HOME NFS VISIT <2 HRS: CPT

## 2025-03-19 NOTE — PROGRESS NOTES
Nursing Visit Note:  Nurse visit today for PIV start for Jaimie Ortiz.     present during visit today: Not Applicable.    Intravenous Access:  Peripheral IV placed.      Note: patient alert and oriented. NAD. Recently returned from [place]. Feeling at baseline. New PIV started with ultrasound guidance. Infusing by end of visit.    Saline administered: 10 (ml)    Supply Check:   Does the patient have all the supplies they need for the next visit?  No, Order placed for Will need delivery of fluids and IV start supplies by next visit. patient states will call to request if not sent prior to next visit.    Next visit plan: 3/26/25 for IV start    Neymar Bledsoe RN 3/19/2025

## 2025-03-24 ENCOUNTER — TELEPHONE (OUTPATIENT)
Dept: HOME HEALTH SERVICES | Facility: HOME HEALTH | Age: 20
End: 2025-03-24
Payer: COMMERCIAL

## 2025-03-24 ENCOUNTER — HOME INFUSION BILLING (OUTPATIENT)
Dept: HOME HEALTH SERVICES | Facility: HOME HEALTH | Age: 20
End: 2025-03-24
Payer: COMMERCIAL

## 2025-03-24 ENCOUNTER — HOME CARE VISIT (OUTPATIENT)
Dept: HOME HEALTH SERVICES | Facility: HOME HEALTH | Age: 20
End: 2025-03-24
Payer: COMMERCIAL

## 2025-03-24 VITALS
SYSTOLIC BLOOD PRESSURE: 112 MMHG | RESPIRATION RATE: 18 BRPM | DIASTOLIC BLOOD PRESSURE: 70 MMHG | OXYGEN SATURATION: 100 % | HEART RATE: 82 BPM | TEMPERATURE: 98 F

## 2025-03-24 PROCEDURE — S1015 IV TUBING EXTENSION SET: HCPCS

## 2025-03-24 PROCEDURE — 99601 HOME NFS VISIT <2 HRS: CPT

## 2025-03-24 NOTE — TELEPHONE ENCOUNTER
Received a call from pt she needs a visit today for US RN for PIV. States she had strep over the weekend and on antibiotics and need fluids. She is available anytime after 4pm until as late as needed.     Pt is aware she has an appt on Wednesday with Neymar however she cannot wait.

## 2025-03-24 NOTE — PROGRESS NOTES
Nursing Visit Note:  Nurse visit today for USpiv for IVF  for Jaimie F Angel.     present during visit today: Not Applicable.    Intravenous Access:  Peripheral IV placed.      Note: patient feeling better today was on antibiotics for strep throat via IV. no fevers. USpiv started and IVF hooked up and patient will dc own Iv when fluids are complete.    Saline administered: 20 (ml)    Supply Check:   Does the patient have all the supplies they need for the next visit?  Yes    Next visit plan: 4/2/25 will cancel this Wednesday the 26th.     Rosi Quarles, RN 3/24/2025

## 2025-03-25 ENCOUNTER — TELEPHONE (OUTPATIENT)
Dept: HOME HEALTH SERVICES | Facility: HOME HEALTH | Age: 20
End: 2025-03-25
Payer: COMMERCIAL

## 2025-03-25 NOTE — TELEPHONE ENCOUNTER
Writer called pt to confirm whether visit on Wednesday 3/26 is needed as she had a visit on Monday 3/24 for PIV. Pt states she will not require visit on Wednesday 3/26, and visit was canceled.

## 2025-03-26 ENCOUNTER — MYC MEDICAL ADVICE (OUTPATIENT)
Dept: INTERNAL MEDICINE | Facility: CLINIC | Age: 20
End: 2025-03-26
Payer: COMMERCIAL

## 2025-03-26 PROCEDURE — S9374 HIT HYDRA 1 LITER DIEM: HCPCS

## 2025-03-31 ENCOUNTER — HOME INFUSION (OUTPATIENT)
Dept: HOME HEALTH SERVICES | Facility: HOME HEALTH | Age: 20
End: 2025-03-31
Payer: COMMERCIAL

## 2025-04-01 ENCOUNTER — TELEPHONE (OUTPATIENT)
Dept: INTERNAL MEDICINE | Facility: CLINIC | Age: 20
End: 2025-04-01
Payer: COMMERCIAL

## 2025-04-01 DIAGNOSIS — R19.5 LOOSE STOOLS: Primary | ICD-10-CM

## 2025-04-01 NOTE — TELEPHONE ENCOUNTER
Spoke to patient, she has been having a lot of diarrhea and abdominal pain related to her crohn's disease.  Patient is asking to have Calprotectin lab and bacterial labs done. patient follow with GI and ID at [place] for Crohn's disease.  done.  Writer advised patient to schedule an appointment with PCP prior to labs being done.  Patient is scheduled with Christen Galan for tomorrow 4/2/25 @ 5pm    Valerie Parra RN on 4/1/2025 at 4:31 PM

## 2025-04-01 NOTE — TELEPHONE ENCOUNTER
Health Call Center    Phone Message    May a detailed message be left on voicemail: yes     Reason for Call:   The patient was calling to speak with the care team about getting the orders placed for her Crohn's Disease, patient said she needs a nurse to call because she's refusing to schedule with any provider due to their lack of knowledge for her health requirements. The patient also said she can't wait until 4/18/25 to be seen by Christen because that's to far out, please review and follow up thank you.    Action Taken: Message routed to:  [facility] pcc    Travel Screening: Not Applicable     Date of Service:

## 2025-04-02 ENCOUNTER — OFFICE VISIT (OUTPATIENT)
Dept: INTERNAL MEDICINE | Facility: CLINIC | Age: 20
End: 2025-04-02
Payer: COMMERCIAL

## 2025-04-02 ENCOUNTER — HOME CARE VISIT (OUTPATIENT)
Dept: HOME HEALTH SERVICES | Facility: HOME HEALTH | Age: 20
End: 2025-04-02
Payer: COMMERCIAL

## 2025-04-02 ENCOUNTER — LAB (OUTPATIENT)
Dept: LAB | Facility: CLINIC | Age: 20
End: 2025-04-02
Payer: COMMERCIAL

## 2025-04-02 VITALS
BODY MASS INDEX: 20.35 KG/M2 | DIASTOLIC BLOOD PRESSURE: 85 MMHG | TEMPERATURE: 98.3 F | HEART RATE: 111 BPM | OXYGEN SATURATION: 97 % | HEIGHT: 62 IN | RESPIRATION RATE: 16 BRPM | WEIGHT: 110.6 LBS | SYSTOLIC BLOOD PRESSURE: 135 MMHG

## 2025-04-02 VITALS
HEART RATE: 98 BPM | TEMPERATURE: 98.4 F | DIASTOLIC BLOOD PRESSURE: 80 MMHG | OXYGEN SATURATION: 98 % | SYSTOLIC BLOOD PRESSURE: 108 MMHG | RESPIRATION RATE: 16 BRPM

## 2025-04-02 DIAGNOSIS — G90.A POTS (POSTURAL ORTHOSTATIC TACHYCARDIA SYNDROME): Primary | ICD-10-CM

## 2025-04-02 DIAGNOSIS — T14.8XXA BRUISING: ICD-10-CM

## 2025-04-02 DIAGNOSIS — K50.90 CROHN'S DISEASE WITHOUT COMPLICATION, UNSPECIFIED GASTROINTESTINAL TRACT LOCATION (H): ICD-10-CM

## 2025-04-02 DIAGNOSIS — R19.5 LOOSE STOOLS: ICD-10-CM

## 2025-04-02 DIAGNOSIS — E56.9 VITAMIN DEFICIENCY: ICD-10-CM

## 2025-04-02 DIAGNOSIS — Z92.29 UP TO DATE WITH HEPATITIS B IMMUNIZATION: ICD-10-CM

## 2025-04-02 LAB
APTT PPP: 33 SECONDS (ref 22–38)
BASOPHILS # BLD AUTO: 0 10E3/UL (ref 0–0.2)
BASOPHILS NFR BLD AUTO: 1 %
EOSINOPHIL # BLD AUTO: 0.1 10E3/UL (ref 0–0.7)
EOSINOPHIL NFR BLD AUTO: 1 %
ERYTHROCYTE [DISTWIDTH] IN BLOOD BY AUTOMATED COUNT: 12.5 % (ref 10–15)
HBV SURFACE AB SERPL IA-ACNC: 850 M[IU]/ML
HBV SURFACE AB SERPL IA-ACNC: REACTIVE M[IU]/ML
HCT VFR BLD AUTO: 42 % (ref 35–47)
HGB BLD-MCNC: 13.9 G/DL (ref 11.7–15.7)
IMM GRANULOCYTES # BLD: 0 10E3/UL
IMM GRANULOCYTES NFR BLD: 0 %
INR PPP: 1.11 (ref 0.85–1.15)
LYMPHOCYTES # BLD AUTO: 2.7 10E3/UL (ref 0.8–5.3)
LYMPHOCYTES NFR BLD AUTO: 43 %
MCH RBC QN AUTO: 28.7 PG (ref 26.5–33)
MCHC RBC AUTO-ENTMCNC: 33.1 G/DL (ref 31.5–36.5)
MCV RBC AUTO: 87 FL (ref 78–100)
MONOCYTES # BLD AUTO: 0.6 10E3/UL (ref 0–1.3)
MONOCYTES NFR BLD AUTO: 9 %
NEUTROPHILS # BLD AUTO: 2.9 10E3/UL (ref 1.6–8.3)
NEUTROPHILS NFR BLD AUTO: 46 %
NRBC # BLD AUTO: 0 10E3/UL
NRBC BLD AUTO-RTO: 0 /100
PLATELET # BLD AUTO: 419 10E3/UL (ref 150–450)
RBC # BLD AUTO: 4.84 10E6/UL (ref 3.8–5.2)
VIT B12 SERPL-MCNC: 167 PG/ML (ref 232–1245)
WBC # BLD AUTO: 6.4 10E3/UL (ref 4–11)

## 2025-04-02 PROCEDURE — 36415 COLL VENOUS BLD VENIPUNCTURE: CPT | Performed by: PATHOLOGY

## 2025-04-02 PROCEDURE — 99601 HOME NFS VISIT <2 HRS: CPT

## 2025-04-02 PROCEDURE — 99214 OFFICE O/P EST MOD 30 MIN: CPT | Performed by: NURSE PRACTITIONER

## 2025-04-02 PROCEDURE — 82607 VITAMIN B-12: CPT | Performed by: NURSE PRACTITIONER

## 2025-04-02 PROCEDURE — 3079F DIAST BP 80-89 MM HG: CPT | Performed by: NURSE PRACTITIONER

## 2025-04-02 PROCEDURE — 84252 ASSAY OF VITAMIN B-2: CPT | Mod: 90 | Performed by: PATHOLOGY

## 2025-04-02 PROCEDURE — 85730 THROMBOPLASTIN TIME PARTIAL: CPT | Performed by: PATHOLOGY

## 2025-04-02 PROCEDURE — 83921 ORGANIC ACID SINGLE QUANT: CPT | Performed by: NURSE PRACTITIONER

## 2025-04-02 PROCEDURE — 86706 HEP B SURFACE ANTIBODY: CPT | Performed by: NURSE PRACTITIONER

## 2025-04-02 PROCEDURE — 85610 PROTHROMBIN TIME: CPT | Performed by: PATHOLOGY

## 2025-04-02 PROCEDURE — S9374 HIT HYDRA 1 LITER DIEM: HCPCS

## 2025-04-02 PROCEDURE — 99000 SPECIMEN HANDLING OFFICE-LAB: CPT | Performed by: PATHOLOGY

## 2025-04-02 PROCEDURE — 85025 COMPLETE CBC W/AUTO DIFF WBC: CPT | Performed by: PATHOLOGY

## 2025-04-02 PROCEDURE — 3075F SYST BP GE 130 - 139MM HG: CPT | Performed by: NURSE PRACTITIONER

## 2025-04-02 ASSESSMENT — ENCOUNTER SYMPTOMS: DIARRHEA: 1

## 2025-04-02 NOTE — PROGRESS NOTES
Nursing Visit Note:  Nurse visit today for piv start visit rescheduled for Jaimie Ortiz.     present during visit today: Not Applicable.    Intravenous Access:  No Intravenous access/labs at this visit.      Note: writer en route for visit originally scheduled at 5 PM. Patient sent text message to  requesting reschedule for 7 PM this evening.     Saline administered: zero (ml)    Supply Check:   Does the patient have all the supplies they need for the next visit?  Yes    Next visit plan: visit rescheduled for 7 PM later this evening.    Neymar Bledsoe RN 4/2/2025

## 2025-04-02 NOTE — TELEPHONE ENCOUNTER
Spoke to the patient, informed her Christen Galan has ordered her labs.  Patient would like to keep her appointment today 4/2/25 with Christen at 5:30 for further evaluation.     Valerie Parra RN on 4/2/2025 at 4:00 PM

## 2025-04-02 NOTE — PROGRESS NOTES
Assessment & Plan     POTS (postural orthostatic tachycardia syndrome)  Referral placed to Dr. Simmons to establish care with Neurology per specialist recommendation at [place] for concern for autonomic dysfunction.    - Adult Neurology  Referral; Future    Bruising and bleeding  Bleeding in mouth with eating and brushing teeth as well as left earlobe bruising which is intermittent. These have been occurring over the past several months. History of elevated prothrombin time in the emergency room when she had presented for abdominal pain. It was not worked up because she was asymptomatic and only slightly above normal range. Will recheck blood counts and PTT/INR today.  - INR; Future  - CBC with platelets and differential; Future    Crohn's disease without complication, unspecified gastrointestinal tract location (H)  Acute increase in bowel frequency and change in stool consistency about a week after IV ceftriaxone for strep pharyngitis. Usually stools about 1 time per week but is having stools 3-4 times per day and they have mucous. No blood or tarry stools. No nausea or vomiting. Her abdominal tenderness is generalized and mild. Orders for stool panel and fecal calprotectin placed in separate encounter. She is dehydrated evidenced by tachycardia and dry mucous membranes, low urine output.  Will have home infusion NS tonight.    Vitamin Deficiency  Through  services ([facility] in [place]) she was found to have low vitamin B12, B2, and MMA levels. Will recheck these with next lab collection per GI recommendations.  - Vitamin B12; Future  - Methylmalonic Acid; Future  - Vitamin B2; Future    Return if symptoms worsen or fail to improve.    Subjective   Jaimie is a 19 year old, presenting for the following health issues:  Diarrhea (Pt reports diarrhea and stomach pain (all over abdomen) related to Crohn's disease, pt was recently on antibiotics. Began Saturday. Denies fever and chills. ) and  Follow Up        4/2/2025     5:41 PM   Additional Questions   Roomed by umair     History of Present Illness       Reason for visit:  Stomach pain    She eats 4 or more servings of fruits and vegetables daily.She consumes 0 sweetened beverage(s) daily.She exercises with enough effort to increase her heart rate 20 to 29 minutes per day.  She exercises with enough effort to increase her heart rate 4 days per week.   She is taking medications regularly.      Two weekends ago had strep, got IV ceftriaxone (through [facility] in [place]). This past Saturday (about 1 week later) she developed abdominal pain and changes to stool pattern.    GI - Increased BM frequency. Usually 1 per week but in the past few days it's been 3-4 times per day. No blood in stool. No black or tarry stool. No nausea/vomiting. This feels similar to when she had a Crohn's flare in the past, but also similar to when she had Salmonella. She is requesting calprotectin and stool panel based on guidance from her GI team at [facility]. She missed her infusion last week and plans to have it tonight. Her last BM was about an hour ago. It was not liquid. It was the consistency of mucous. Abdominal pain is generalized. She always has epigastric tenderness but now the discomfort is also more diffuse.  No known fevers, denies fevers/chills.    BLEEDING - She noticed in the past week her gums are bleeding when she brushes her teeth. Describe as a 'blood bath' when she brushes teeth or sometimes when she eats. She spits 3-4 times and each time it's about a teaspoon of blood. She feels that her whole mouth gets bloody when this occurs. She also notices left earlobe bruising comes and goes. She does not wear earrings. She has no known injuries to the area. She remembers having an elevated prothrombin time that was found in the emergency room about a year ago but it was barely high and she was asymptomatic at the time so it wasn't investigated further.    JOINTS  "- Also having lots of joint pain after her strep. Her hips, knees, and ankles are sore. Comes and goes. No redness or swelling of joints. Doesn't limit her from doing activities.     Medical in [place] medical team gave IV Ceftriaxone for strep throat    Review of Systems  Constitutional, HEENT, cardiovascular, pulmonary, gi and gu systems are negative, except as otherwise noted.      Objective    /85 (BP Location: Right arm, Patient Position: Sitting, Cuff Size: Adult Small)   Pulse 111   Temp 98.3  F (36.8  C) (Oral)   Resp 16   Ht 1.575 m (5' 2.01\")   Wt 50.2 kg (110 lb 9.6 oz)   SpO2 97%   BMI 20.22 kg/m    Body mass index is 20.22 kg/m .    Physical Exam   GENERAL: alert and no distress  EYES: Eyes grossly normal to inspection, conjunctivae and sclerae normal; wears glasses  HENT: ear canals and TM's normal, nose and mouth without ulcers or lesions; no bruising noted today  NECK: no adenopathy, no asymmetry, masses, or scars  RESP: lungs clear to auscultation - no rales, rhonchi or wheezes  CV: regular rate and rhythm, normal S1 S2, no S3 or S4, no murmur, click or rub, no peripheral edema  ABDOMEN: soft, generalized mild tenderness without rebound or guarding, no hepatosplenomegaly, no masses and bowel sounds normal  MS: no gross musculoskeletal defects noted, no edema  SKIN: no rashes on visualized skin  NEURO: Normal strength and tone, mentation intact and speech normal  PSYCH: mentation appears normal, affect normal/bright      Guadalupe Holguin RN, Student FNP    I saw and examined this patient with the NP student and agree with the student's findings and plan of care as documented in the student's note with the following modifications: none    Signed Electronically by: ASHLIE Jama CNP  "

## 2025-04-03 NOTE — PROGRESS NOTES
Nursing Visit Note:  Nurse visit today for PIV start for Jaimie ARMAS Angel.     present during visit today: Not Applicable.    Intravenous Access:  Peripheral IV placed.    New ultrasound guided PIV placed    Note: patient alert and oriented. Reports recent increased Crohn s symptoms. Provider aware. Complains of 5/10 abdominal pain currently. Up to 7/10 intermittently. Has not used any pain management medication. Also having increased diarrhea; 3 to 5 times per day. Slight increase in dizziness. No falls, however. Patient reporting increased oral bleeding with eating and brushing teeth. Recent strep diagnosis per patient. Provider aware of this as well. New PIV started today. IV fluids infusing by end of visit. Patient will call with questions or concerns.    Saline administered: 10 (ml)    Supply Check:   Does the patient have all the supplies they need for the next visit?  Yes    Next visit plan: 4/9/25    Neymar Bledsoe RN 4/2/2025

## 2025-04-04 ENCOUNTER — ANCILLARY PROCEDURE (OUTPATIENT)
Dept: ULTRASOUND IMAGING | Facility: CLINIC | Age: 20
End: 2025-04-04
Attending: INTERNAL MEDICINE
Payer: COMMERCIAL

## 2025-04-04 DIAGNOSIS — R10.31 ABDOMINAL PAIN, RIGHT LOWER QUADRANT: ICD-10-CM

## 2025-04-04 PROCEDURE — 76856 US EXAM PELVIC COMPLETE: CPT | Mod: GC | Performed by: RADIOLOGY

## 2025-04-05 LAB

## 2025-04-05 PROCEDURE — 87507 IADNA-DNA/RNA PROBE TQ 12-25: CPT | Performed by: NURSE PRACTITIONER

## 2025-04-05 PROCEDURE — 83993 ASSAY FOR CALPROTECTIN FECAL: CPT | Performed by: NURSE PRACTITIONER

## 2025-04-05 PROCEDURE — 99000 SPECIMEN HANDLING OFFICE-LAB: CPT | Performed by: PATHOLOGY

## 2025-04-07 LAB — CALPROTECTIN STL-MCNT: 42.7 MG/KG (ref 0–49.9)

## 2025-04-09 ENCOUNTER — HOME CARE VISIT (OUTPATIENT)
Dept: HOME HEALTH SERVICES | Facility: HOME HEALTH | Age: 20
End: 2025-04-09
Payer: COMMERCIAL

## 2025-04-09 VITALS
SYSTOLIC BLOOD PRESSURE: 112 MMHG | HEART RATE: 81 BPM | TEMPERATURE: 98.7 F | DIASTOLIC BLOOD PRESSURE: 76 MMHG | RESPIRATION RATE: 16 BRPM | OXYGEN SATURATION: 97 %

## 2025-04-09 LAB — METHYLMALONATE SERPL-SCNC: 0.25 UMOL/L (ref 0–0.4)

## 2025-04-09 PROCEDURE — 99601 HOME NFS VISIT <2 HRS: CPT

## 2025-04-09 RX ORDER — CYANOCOBALAMIN 1000 UG/ML
1000 INJECTION, SOLUTION INTRAMUSCULAR; SUBCUTANEOUS
COMMUNITY

## 2025-04-10 NOTE — PROGRESS NOTES
Nursing Visit Note:  Nurse visit today for PIV start for Jaimie Ortiz.     present during visit today: Not Applicable.    Intravenous Access:  Peripheral IV placed.      Note: patient alert and oriented. NAD. Reports symptoms from last week have improved, but still not completely resolved. No longer having diarrhea. Back to baseline stools. New PIV started today with ultrasound guidance. IV fluid infusing by end of visit. Encourage to call with questions/concern/updates.    Saline administered: 15 (ml)    Supply Check:   Does the patient have all the supplies they need for the next visit?  No, Order placed for patient will call to coordinate supply delivery for next week. Will need IV fluids and tubing.    Next visit plan: 4/16/25.    Neymar Bledsoe RN 4/9/2025

## 2025-04-16 ENCOUNTER — TELEPHONE (OUTPATIENT)
Dept: PEDIATRIC CARDIOLOGY | Facility: CLINIC | Age: 20
End: 2025-04-16
Payer: COMMERCIAL

## 2025-04-16 ENCOUNTER — HOME INFUSION BILLING (OUTPATIENT)
Dept: HOME HEALTH SERVICES | Facility: HOME HEALTH | Age: 20
End: 2025-04-16
Payer: COMMERCIAL

## 2025-04-16 ENCOUNTER — HOME CARE VISIT (OUTPATIENT)
Dept: HOME HEALTH SERVICES | Facility: HOME HEALTH | Age: 20
End: 2025-04-16
Payer: COMMERCIAL

## 2025-04-16 VITALS
SYSTOLIC BLOOD PRESSURE: 116 MMHG | TEMPERATURE: 97.7 F | OXYGEN SATURATION: 100 % | DIASTOLIC BLOOD PRESSURE: 80 MMHG | HEART RATE: 88 BPM | RESPIRATION RATE: 16 BRPM

## 2025-04-16 PROCEDURE — 99601 HOME NFS VISIT <2 HRS: CPT

## 2025-04-16 PROCEDURE — S1015 IV TUBING EXTENSION SET: HCPCS

## 2025-04-16 NOTE — TELEPHONE ENCOUNTER
[facility] Call Center    Phone Message    May a detailed message be left on voicemail: yes     Reason for Call: Appointment Intake    Referring Provider Name: Christen Galan APRN CNP in [facility]   Diagnosis and/or Symptoms: POTS    Patient would like to check if Peds Cardio might see for POTS. Patient states she was previously diagnosed at [place] and was told to follow up with [facility] going forward. Patient states she contacted adult cardio at [facility] and was told they do not treat POTS    Action Taken: Message routed to:  Other: Peds Cardiology    Travel Screening: Not Applicable     Date of Service:

## 2025-04-16 NOTE — TELEPHONE ENCOUNTER
Per EP Clinic should still be scheduled with general. Called and scheduled patient with general provider for tachycardia.    Myke Patel   [facility]- Cardiology   [address] [place] [id number]  Hours: 8:00am-4:30pm

## 2025-04-16 NOTE — TELEPHONE ENCOUNTER
Called and spoke with patient letting her know that our Adults General Cardiology Clinic sees patients for the symptoms they're having. From there they may be referred to another specialist within Cardiology. Patient stated she's currently being treated for her symptoms and they are under control with IV Treatments. She was originally seen and treated by GI at [facility]. She has also had testing done at [place]. Checking with EP team to see if she should still be scheduled with General Cardiology.    Myke Patel   [facility] [place]- Cardiology   [address] [place] [id number]

## 2025-04-23 PROCEDURE — S9374 HIT HYDRA 1 LITER DIEM: HCPCS

## 2025-04-24 ENCOUNTER — HOME CARE VISIT (OUTPATIENT)
Dept: HOME HEALTH SERVICES | Facility: HOME HEALTH | Age: 20
End: 2025-04-24
Payer: COMMERCIAL

## 2025-04-24 VITALS
DIASTOLIC BLOOD PRESSURE: 68 MMHG | TEMPERATURE: 98.6 F | OXYGEN SATURATION: 99 % | BODY MASS INDEX: 20.44 KG/M2 | RESPIRATION RATE: 18 BRPM | HEART RATE: 80 BPM | SYSTOLIC BLOOD PRESSURE: 118 MMHG | WEIGHT: 111.8 LBS

## 2025-04-24 NOTE — PROGRESS NOTES
Nursing Visit Note:  Nurse visit today for USPIV  for Jaimie Ortiz.     present during visit today: Not Applicable.    Intravenous Access:  Peripheral IV placed.      Note: patient tolerating fluids  Will discontinue IV after IV fluids are complete   no new complaints     Saline administered: 10 (ml)    Supply Check:   Does the patient have all the supplies they need for the next visit?  Yes    Next visit plan: patient will call next week thinking Friday May 2    Rosi Quarles RN 4/24/2025

## 2025-04-28 ENCOUNTER — TELEPHONE (OUTPATIENT)
Dept: INTERNAL MEDICINE | Facility: CLINIC | Age: 20
End: 2025-04-28
Payer: COMMERCIAL

## 2025-04-28 NOTE — TELEPHONE ENCOUNTER
Spoke to patient discussed the requirements needed for her autonomic testing    This test requires that the patient abstain from food or nicotine for at least 4 hours and from caffeine or alcohol for at least 12 hours prior to the test.  If you are taking antidepressants, antihistamines, stimulants, muscle relaxants, diuretics, antihypertensive medications, or opioids, please discuss with your ordering provider for instructions on how to hold these medications prior to the test.  Patients should not use antiperspirants before the test, as it can interfere with sweat measurement. To prepare for your autonomic test, please wear comfortable loose fitting clothes. Shorts or pants that can be pulled up past the knee and a non-restrictive short-sleeved shirt would work best.    -HOLD Fluoxetine for 48 hours prior to the test.     Patient acknowledged understanding.     Valerie Parra RN on 4/28/2025 at 3:26 PM

## 2025-04-29 ENCOUNTER — HOME INFUSION (OUTPATIENT)
Dept: HOME HEALTH SERVICES | Facility: HOME HEALTH | Age: 20
End: 2025-04-29
Payer: COMMERCIAL

## 2025-04-29 ENCOUNTER — TELEPHONE (OUTPATIENT)
Dept: NEUROLOGY | Facility: CLINIC | Age: 20
End: 2025-04-29
Payer: COMMERCIAL

## 2025-04-29 ENCOUNTER — DOCUMENTATION ONLY (OUTPATIENT)
Dept: HOME HEALTH SERVICES | Facility: HOME HEALTH | Age: 20
End: 2025-04-29
Payer: COMMERCIAL

## 2025-04-29 ENCOUNTER — TELEPHONE (OUTPATIENT)
Dept: PHARMACY | Facility: CLINIC | Age: 20
End: 2025-04-29
Payer: COMMERCIAL

## 2025-04-29 DIAGNOSIS — K50.10 CROHN'S DISEASE OF COLON WITHOUT COMPLICATION (H): ICD-10-CM

## 2025-04-29 NOTE — PROGRESS NOTES
"Patient sent TT at 3:21pm this afternoon to cancel appt on Wednesday 4/30 with Neymar. \"I will need to cancel this appointment.\"     Wednesday 4/30 visit cancelled.   "

## 2025-04-29 NOTE — TELEPHONE ENCOUNTER
Patient has transferred IBD care to [place]. She has canceled upcoming MTM and GI visits. Will officially discharge from [facility]. Will route to [facility] as FYI, care team already aware she has transferred care.    Myke Segura, PharmD, BCPS  [facility] Pharmacist   [facility]  Phone: [phone]

## 2025-04-30 ENCOUNTER — TELEPHONE (OUTPATIENT)
Dept: HOME HEALTH SERVICES | Facility: HOME HEALTH | Age: 20
End: 2025-04-30
Payer: COMMERCIAL

## 2025-04-30 PROCEDURE — S9374 HIT HYDRA 1 LITER DIEM: HCPCS

## 2025-04-30 NOTE — PROGRESS NOTES
CARDIOLOGY CLINIC INITIAL VISIT NOTE    Patient name: Jaimie Ortiz  Age: 19 year old  Sex: female  YOB: 2005  Primary Care Physician: Christen Galan      CHIEF COMPLAINT: POTS syndrome evaluation.      HPI: Jaimie Ortiz is a 19 year old female with past medical history pertinent for Crohn's disease, Gilbert's syndrome, lifelong dysphagia, gastroesophageal reflux disease, irritable bowel syndrome, generalized anxiety disorder, autism spectrum disorder, OCD, and several noncardiac comorbidities who is referred for POTS evaluation.  She is accompanied by her mother.    She describes a lifelong history of orthostatic intolerance; feels lightheaded with standing up.  She also has GI motility issues and poor oral hydration.  She has been getting IV fluids every couple weeks for the past several years; she has not developed scarring of her veins such that continued IV sticks are not possible.  She wonders if it is possible to get a PICC line.    She has undergone an autonomic reflex screen and thermoregulatory sweat test at [facility] in July 2024; both studies roughly within normal limits.  On the autonomic reflex screen there was preserved cardiovagal and cardiovascular adrenergic function.  She also did tilt table test to [facility]; no change in blood pressure and increase in heart rate of 32 beats per minute; did not meet POTS criteria given her age.  She has an upcoming appointment in July 2025 at [facility] in the Department of neurology/cardiology for evaluation about syndrome; it appears that multiple tests have been ordered in anticipation of this including Holter monitor, echocardiogram, and blood work.    REVIEW OF SYSTEMS: A 10-point ROS was performed. Pertinent positives are mentioned above in the HPI. Remainder of systems were reviewed and are negative.      PAST MEDICAL AND SURGICAL HISTORY:  I have reviewed this patient's past medical and surgical history.    Past Medical  History:   Diagnosis Date    Allergy to mold spores     12/9/13 skin tests pos. only for M/W only    Anxiety     Crohn's disease (H)     Diagnostic skin and sensitization tests 12/9/13 skin tests pos. only for M/W only    HSP (Henoch Schonlein purpura) 12/2007    resolved    Other and unspecified noninfectious gastroenteritis and colitis(558.9) 05/20/2009    Seasonal allergic rhinitis        Past Surgical History:   Procedure Laterality Date    CAPSULE/PILL CAM ENDOSCOPY N/A 02/03/2021    Procedure: VIDEO CAPSULE ENDOSCOPY;  Surgeon: Marily Lane MD;  Location: [facility]     COLONOSCOPY N/A 12/16/2020    Procedure: COLONOSCOPY, WITH POLYPECTOMY AND BIOPSY;  Surgeon: Marily Lane MD;  Location: [facility]     COLONOSCOPY N/A 07/13/2021    Procedure: COLONOSCOPY, WITH POLYPECTOMY AND BIOPSY;  Surgeon: Marily Lane MD;  Location: [facility]     COLONOSCOPY N/A 08/01/2022    Procedure: COLONOSCOPY, WITH POLYPECTOMY AND BIOPSY;  Surgeon: Marily Lane MD;  Location: [facility]     ESOPHAGOSCOPY, GASTROSCOPY, DUODENOSCOPY (EGD), COMBINED N/A 12/16/2020    Procedure: upper endoscopy, WITH BIOPSY;  Surgeon: Marily Lane MD;  Location: [facility]     ESOPHAGOSCOPY, GASTROSCOPY, DUODENOSCOPY (EGD), COMBINED N/A 07/13/2021    Procedure: ESOPHAGOGASTRODUODENOSCOPY, WITH BIOPSY;  Surgeon: Marily Lane MD;  Location: [facility]     ESOPHAGOSCOPY, GASTROSCOPY, DUODENOSCOPY (EGD), COMBINED N/A 08/01/2022    Procedure: ESOPHAGOGASTRODUODENOSCOPY, WITH BIOPSY;  Surgeon: Marily Lane MD;  Location: [facility]     ORTHOPEDIC SURGERY  2010    Trigger thumb    TONSILLECTOMY, ADENOIDECTOMY, COMBINED Bilateral 12/19/2019    Procedure: Bilateral TONSILLECTOMY, possible adenoidectomy;  Surgeon: Markus Rojas MD;  Location: [facility]         FAMILY HISTORY:  I have reviewed this patient's family history.    Family History   Problem Relation Age of Onset    Eye Disorder Mother     Brain Cancer  Father     Breast Cancer Maternal Grandmother     Hypertension Maternal Grandfather     Hypertension Paternal Grandmother     Crohn's Disease Other     Ulcerative Colitis Other     Colon Polyps Other     Colon Cancer Other            SOCIAL HISTORY:  I have reviewed this patient's social history.    Social History     Socioeconomic History    Marital status: Single   Tobacco Use    Smoking status: Never     Passive exposure: Never    Smokeless tobacco: Never   Vaping Use    Vaping status: Never Used   Substance and Sexual Activity    Alcohol use: No    Drug use: No    Sexual activity: Never     Social Drivers of Health     Financial Resource Strain: Low Risk  (2/11/2025)    Financial Resource Strain     Within the past 12 months, have you or your family members you live with been unable to get utilities (heat, electricity) when it was really needed?: No   Food Insecurity: Low Risk  (2/11/2025)    Food Insecurity     Within the past 12 months, did you worry that your food would run out before you got money to buy more?: No     Within the past 12 months, did the food you bought just not last and you didn t have money to get more?: No   Transportation Needs: Low Risk  (2/11/2025)    Transportation Needs     Within the past 12 months, has lack of transportation kept you from medical appointments, getting your medicines, non-medical meetings or appointments, work, or from getting things that you need?: No   Physical Activity: Insufficiently Active (5/28/2024)    Received from [facility]    Exercise Vital Sign     Days of Exercise per Week: 5 days     Minutes of Exercise per Session: 20 min   Interpersonal Safety: Low Risk  (5/17/2024)    Interpersonal Safety     Do you feel physically and emotionally safe where you currently live?: Yes     Within the past 12 months, have you been hit, slapped, kicked or otherwise physically hurt by someone?: No     Within the past 12 months, have you been humiliated or emotionally  "abused in other ways by your partner or ex-partner?: No   Housing Stability: Low Risk  (2/11/2025)    Housing Stability     Do you have housing? : Yes     Are you worried about losing your housing?: No         CURRENT MEDICATIONS:  I have reviewed this patient's current medications.    Current Outpatient Medications   Medication Sig Dispense Refill    acetaminophen (TYLENOL) 325 MG tablet Take 2 tablets (650 mg) 30 minutes before injecting Humira. 12 tablet 1    BANOPHEN 25 MG capsule TAKE 1 CAPSULE BY MOUTH 30 MINUTES BEFORE INJECTING HUMIRA      COMPOUNDED NON-CONTROLLED SUBSTANCE (CMPD RX) - PHARMACY TO MIX COMPOUNDED MEDICATION Apply 20 mLs into each nare 2 times daily Gentamicin/Dexamethasone 160/120mg/liter saline 1000 mL 12    cyanocobalamin (CYANOCOBALAMIN) 1000 mcg/mL injection Inject 1,000 mcg into the muscle every 30 days. (Patient taking differently: Inject into the muscle. Pt states getting every two weeks. Unsure of dose.)      dicyclomine (BENTYL) 10 MG capsule Take 1 capsule (10 mg) by mouth 4 times daily as needed (abdominal pain). Slowly wean off as directed. 120 capsule 3    diphenhydrAMINE (BENADRYL) 25 MG tablet Take 1 tablet (25 mg) 30 minutes before injecting Humira 25 tablet 1    Emergency Supply Kit, PIV, Patient use for emergency only. Contents: 3 sodium chloride 0.9% flushes, 1 IV start kit, 1 microclave ext set 14\", 1 each IV Cath 22 G/1\" and 24G/3/4\", 6 alcohol prep pads, 4 nitrile gloves (med). Call [phone] to reorder. [id number] kit 0    EPINEPHrine (ANY BX GENERIC EQUIV) 0.3 MG/0.3ML injection 2-pack Inject 0.3 mLs (0.3 mg) into the muscle as needed for anaphylaxis May repeat one time in 5-15 minutes if response to initial dose is inadequate. 2 each 0    famotidine (PEPCID) 20 MG tablet Take 1 tablet (20 mg) by mouth At Bedtime 30 tablet 3    fluocinonide (LIDEX) 0.05 % external ointment Apply topically 2 times daily Apply as directed 60 g 0    FLUoxetine (PROZAC) 10 MG capsule " Take 1 capsule (10 mg) by mouth daily 90 capsule 2    FLUoxetine (PROZAC) 20 MG capsule Take 20 mg with 10 mg capsule (for total of 30 mg) for 4 weeks, then taper to 20 mg daily 60 capsule 0    FLUoxetine (PROZAC) 40 MG capsule Take 1 capsule (40 mg) by mouth daily 90 capsule 2    gentamicin (GARAMYCIN) 0.1 % external ointment Apply topically 2 times daily Mixed with lidex ointment to areas of rash 30 g 0    HUMIRA, 2 PEN, 40 MG/0.4ML pen kit Inject 0.4 mLs (40 mg) subcutaneously every 14 days. 0.8 mL 5    hydrOXYzine HCl (ATARAX) 10 MG tablet Take 2 tablets (20 mg) by mouth at bedtime 180 tablet 2    mometasone (ELOCON) 0.1 % external ointment Apply twice daily for 2 weeks for sun rash on chest and arms and ears 45 g 3    norethindrone-ethinyl estradiol (MICROGESTIN 1.5/30) 1.5-30 MG-MCG tablet Take 1 tablet by mouth daily . Active tabs only, skip placebo pills. Take continuously. 84 tablet 4    ondansetron (ZOFRAN ODT) 4 MG ODT tab Take 1 tablet (4 mg) by mouth every 8 hours as needed for nausea 12 tablet 3    pantoprazole (PROTONIX) 40 MG EC tablet Take 1 tablet (40 mg) by mouth daily. 90 tablet 0    senna (SENNA LAX) 8.6 MG tablet Take 1 tablet by mouth daily 30 tablet 5    sodium chloride 0.9 % in 1,000 mL via gravity infusion Infuse over 2-4 hours into the vein once a week. Infuse 1 bag(s) (1000 mL). Each bag to infuse over 2-4 hours via gravity 379971 mL 0    sodium chloride, PF, 0.9% PF flush Inject 10 mLs into the vein as needed for line flush. Flush IV before and after medication administration as directed and/or at least every 12 hours. [id number] mL 0    tacrolimus (PROTOPIC) 0.1 % external ointment Apply topically.      VTAMA 1 % CREA Apply 1 Application topically daily as needed Stop for pregnancy 60 g 11         ALLERGIES/SENSITIVITIES:  I have reviewed this patient's allergies.     Allergies   Allergen Reactions    Gluten Meal Anaphylaxis     Throat gets sores and swelling    Other reaction(s): GI  "Upset   Sensitivity, avoiding    Sensitivity, avoiding      Other reaction(s): GI Upset Sensitivity, avoiding    Infliximab Anaphylaxis     Pt will still be getting it. Give pre-meds and give over 2 hours      Other Drug Allergy (See Comments) GI Disturbance     Sorbitol-Mannitol-Xanthan Gum - Patient experienced nausea, dizziness, and felt all over body \"tingling\" - discontinued Breeza and continued with water  2/12/2025    Tamiflu [Oseltamivir] Rash         PHYSICAL EXAM: Ht 1.575 m (5' 2\")   Wt 51.9 kg (114 lb 6.4 oz)   SpO2 97%   BMI 20.92 kg/m    Orthostatics:  Lying down: 130/86 with heart rate 99  Sitting up: 132/91 with heart rate 98  Standing up: 126/89 with heart rate 108    GENERAL APPEARANCE: No acute distress, awake, alert.    HEENT: No scleral icterus or conjuctival hemorrhage, mucous membranes are moist.    NECK: Supple without thyromegaly. No carotid bruits are present. Normal jugular venous pressure.    CVS: Regular rate and rhythm with normal S1 and S2, no S3 or S4 gallop is present.    LUNG: Clear to auscultation without crackles or wheezes. Respiratory effort is normal.    GASTROINTESTINAL: Abdomen is soft and non-tender with normal bowel sounds.    EXTREMITIES: No clubbing or cyanosis. No edema. Normal and symmetric pulses.    PSYCHIATRIC: Normal affect.    NEUROLOGIC: Alert and appropriate. No obvious focal deficits.      LABS AND DIAGNOSTICS:    CBC:  Lab Results   Component Value Date    WBC 6.6 04/04/2025    WBC 6.5 05/22/2021    RBC 4.35 04/04/2025    RBC 4.76 05/22/2021    HGB 12.7 04/04/2025    HGB 13.6 05/22/2021    HCT 37.7 04/04/2025    HCT 41.6 05/22/2021    MCV 87 04/04/2025    MCV 87 05/22/2021    MCH 29.2 04/04/2025    MCH 28.6 05/22/2021    MCHC 33.7 04/04/2025    MCHC 32.7 05/22/2021    RDW 12.6 04/04/2025    RDW 11.9 05/22/2021     04/04/2025     05/22/2021       BMP:  Lab Results   Component Value Date     04/04/2025     05/22/2021    POTASSIUM 3.5 " 04/04/2025    POTASSIUM 4.0 04/01/2023    POTASSIUM 4.2 05/22/2021    CHLORIDE 105 04/04/2025    CHLORIDE 110 04/01/2023    CHLORIDE 107 05/22/2021    CO2 23 04/04/2025    CO2 24 04/01/2023    CO2 27 05/22/2021    ANIONGAP 11 04/04/2025    ANIONGAP 4 04/01/2023    ANIONGAP 3 05/22/2021    GLC 93 04/04/2025     (H) 04/01/2023    GLC 85 05/22/2021    BUN 8.2 04/04/2025    BUN 5 (L) 04/01/2023    BUN 11 05/22/2021    CR 0.62 04/04/2025    CR 0.66 05/22/2021    GFRESTIMATED >90 04/04/2025    GFRESTIMATED GFR not calculated, patient <18 years old. 05/22/2021    GFRESTBLACK GFR not calculated, patient <18 years old. 05/22/2021    CHRISTINA 9.4 04/04/2025    CHRISTINA 9.7 05/22/2021       LIPIDS:  Lab Results   Component Value Date    CHOL 183 (H) 02/03/2021    HDL 57 02/03/2021     (H) 02/03/2021    TRIG 82 02/03/2021       LFT:  Lab Results   Component Value Date    PROTTOTAL 7.1 04/04/2025    PROTTOTAL 7.8 05/22/2021    ALBUMIN 4.1 04/04/2025    ALBUMIN 3.4 04/01/2023    ALBUMIN 4.4 05/22/2021    BILITOTAL 1.6 (H) 04/04/2025    BILITOTAL 2.0 (H) 05/22/2021    ALKPHOS 39 (L) 04/04/2025    ALKPHOS 81 05/22/2021    AST 16 04/04/2025    AST 16 05/22/2021    ALT 9 04/04/2025    ALT 20 05/22/2021       TFT:  Lab Results   Component Value Date    TSH 0.64 01/21/2021    T4 0.79 11/12/2018       HgbA1c:   Lab Results   Component Value Date    A1C 4.9 05/30/2018       INR RESULTS:  Lab Results   Component Value Date    INR 1.11 04/02/2025       ECG (personally reviewed):  5/1/2025:  Normal sinus rhythm at 94 bpm.  No delta wave.  Normal QTc.  10/17/2021: Sinus tachycardia 101 bpm.      Thermoregulatory sweat test 9/26/2024 ([facility]):   Normal thermoregulatory sweat test.     Autonomic reflex screen 11/29/2024 ([facility]):  Normal, limited study. Quantitative sudomotor Axon reflex test (Q-Sweat   values) is normal at all sites.  Compared to the prior study of 9/27/2024,   the previously seen impairment is resolved  suggesting prior medication   effect.     Autonomic reflex screen 9/27/2024 ([facility]):  There is evidence of non-length dependent postganglionic sympathetic   sudomotor with preserved cardiovagal and cardiovascular adrenergic   function. With the normal thermoregulatory sweat test, these findings are   of uncertain significance. Medication effects (hydroxyzine, Linzess) are   likely contributing to the QSART results.     Echocardiogram:  Not performed.          IMPRESSION AND PLAN: Jaimie Ortiz is a 19 year old female with past medical history pertinent for Crohn's disease, Gilbert's syndrome, lifelong dysphagia, gastroesophageal reflux disease, irritable bowel syndrome, generalized anxiety disorder, autism spectrum disorder, OCD, and several noncardiac comorbidities who is referred for POTS evaluation.     Orthostatic intolerance: She describes symptoms of orthostatic intolerance; evaluation negative for POTS to date; in particular tilt table test, autonomic reflex test, and thermoregulatory sweat test within normal limits.  Today in the office she exhibited a heart rate increase of only 8 bpm with no change in blood pressure with orthostatic measurements.  Nonetheless she clearly describes orthostatic intolerance; she was asking me to endorse a PICC line for continued administration of IV fluids.  I do not think is a good idea for multiple reasons.  She has already had complications with frequent IV sticks/IV fluids with scarring of her veins in both arms.  A PICC line would expose her to the risk of central infection; given her young age it is really a path to nowhere; any volume increase would be only temporary and long-term complication risks would escalate over the years.  I encouraged her to proceed with her [facility] POTS evaluation.  We discussed conservative measures and also medical measures such as fludrocortisone if felt appropriate by her [facility] team.  We had an extensive discussion  over 60 minutes.          Thank you for the opportunity to participate in the care of this patient. Please do not hesitate to contact me with any questions or concerns.    CC: ASHLIE Yoo [facility]  [address] [place] [address]    Today's clinic visit entailed:    60 minutes spent by me on the date of the encounter doing chart review, review of outside records, review of test results, interpretation of tests, patient visit, and documentation   Provider  Link to MDM Help Grid     The level of medical decision making during this visit was of moderate complexity.      Nik Cruz MD, FACC, FASE, FSCMR.

## 2025-05-01 ENCOUNTER — TELEPHONE (OUTPATIENT)
Dept: NEUROLOGY | Facility: CLINIC | Age: 20
End: 2025-05-01

## 2025-05-01 ENCOUNTER — OFFICE VISIT (OUTPATIENT)
Dept: CARDIOLOGY | Facility: CLINIC | Age: 20
End: 2025-05-01
Attending: NURSE PRACTITIONER
Payer: COMMERCIAL

## 2025-05-01 VITALS — OXYGEN SATURATION: 97 % | WEIGHT: 114.4 LBS | HEIGHT: 62 IN | BODY MASS INDEX: 21.05 KG/M2

## 2025-05-01 DIAGNOSIS — G90.A POTS (POSTURAL ORTHOSTATIC TACHYCARDIA SYNDROME): ICD-10-CM

## 2025-05-01 NOTE — TELEPHONE ENCOUNTER
Caller spoke with the pt regarding scheduling an Autonomic Testing appointment with Dr. Simmons. The patient stated they will call back later.    Pt declined to schedule at this time.      Rosi Torres on 5/1/2025 at 9:17 AM

## 2025-05-01 NOTE — LETTER
5/1/2025    Christen Galan, APRN CNP  [address] [facility] [address]    RE: Jaimie Falkjordi       Dear Colleague,     I had the pleasure of seeing Jaimie Ortiz in the [facility].  CARDIOLOGY CLINIC INITIAL VISIT NOTE    Patient name: Jaimie Ortiz  Age: 19 year old  Sex: female  YOB: 2005  Primary Care Physician: Christen Galan      CHIEF COMPLAINT: POTS syndrome evaluation.      HPI: Jaimie Ortiz is a 19 year old female with past medical history pertinent for Crohn's disease, Gilbert's syndrome, lifelong dysphagia, gastroesophageal reflux disease, irritable bowel syndrome, generalized anxiety disorder, autism spectrum disorder, OCD, and several noncardiac comorbidities who is referred for POTS evaluation.  She is accompanied by her mother.    She describes a lifelong history of orthostatic intolerance; feels lightheaded with standing up.  She also has GI motility issues and poor oral hydration.  She has been getting IV fluids every couple weeks for the past several years; she has not developed scarring of her veins such that continued IV sticks are not possible.  She wonders if it is possible to get a PICC line.    She has undergone an autonomic reflex screen and thermoregulatory sweat test at [facility] in July 2024; both studies roughly within normal limits.  On the autonomic reflex screen there was preserved cardiovagal and cardiovascular adrenergic function.  She also did tilt table test to [facility]; no change in blood pressure and increase in heart rate of 32 beats per minute; did not meet POTS criteria given her age.  She has an upcoming appointment in July 2025 at [facility] in the Department of neurology/cardiology for evaluation about syndrome; it appears that multiple tests have been ordered in anticipation of this including Holter monitor, echocardiogram, and blood work.    REVIEW OF SYSTEMS: A 10-point ROS was performed. Pertinent  positives are mentioned above in the HPI. Remainder of systems were reviewed and are negative.      PAST MEDICAL AND SURGICAL HISTORY:  I have reviewed this patient's past medical and surgical history.    Past Medical History:   Diagnosis Date     Allergy to mold spores     12/9/13 skin tests pos. only for M/W only     Anxiety      Crohn's disease (H)      Diagnostic skin and sensitization tests 12/9/13 skin tests pos. only for M/W only     HSP (Henoch Schonlein purpura) 12/2007    resolved     Other and unspecified noninfectious gastroenteritis and colitis(558.9) 05/20/2009     Seasonal allergic rhinitis        Past Surgical History:   Procedure Laterality Date     CAPSULE/PILL CAM ENDOSCOPY N/A 02/03/2021    Procedure: VIDEO CAPSULE ENDOSCOPY;  Surgeon: Marily Lane MD;  Location: [facility]      COLONOSCOPY N/A 12/16/2020    Procedure: COLONOSCOPY, WITH POLYPECTOMY AND BIOPSY;  Surgeon: Marily Lane MD;  Location: [facility]      COLONOSCOPY N/A 07/13/2021    Procedure: COLONOSCOPY, WITH POLYPECTOMY AND BIOPSY;  Surgeon: Marily Lane MD;  Location: [facility]      COLONOSCOPY N/A 08/01/2022    Procedure: COLONOSCOPY, WITH POLYPECTOMY AND BIOPSY;  Surgeon: Marily Lane MD;  Location: [facility]      ESOPHAGOSCOPY, GASTROSCOPY, DUODENOSCOPY (EGD), COMBINED N/A 12/16/2020    Procedure: upper endoscopy, WITH BIOPSY;  Surgeon: Marily Lane MD;  Location: [facility]      ESOPHAGOSCOPY, GASTROSCOPY, DUODENOSCOPY (EGD), COMBINED N/A 07/13/2021    Procedure: ESOPHAGOGASTRODUODENOSCOPY, WITH BIOPSY;  Surgeon: Marily Lane MD;  Location: [facility]      ESOPHAGOSCOPY, GASTROSCOPY, DUODENOSCOPY (EGD), COMBINED N/A 08/01/2022    Procedure: ESOPHAGOGASTRODUODENOSCOPY, WITH BIOPSY;  Surgeon: Marily Lane MD;  Location: [facility]      ORTHOPEDIC SURGERY  2010    Trigger thumb     TONSILLECTOMY, ADENOIDECTOMY, COMBINED Bilateral 12/19/2019    Procedure: Bilateral TONSILLECTOMY, possible  adenoidectomy;  Surgeon: Markus Rojas MD;  Location: [facility]         FAMILY HISTORY:  I have reviewed this patient's family history.    Family History   Problem Relation Age of Onset     Eye Disorder Mother      Brain Cancer Father      Breast Cancer Maternal Grandmother      Hypertension Maternal Grandfather      Hypertension Paternal Grandmother      Crohn's Disease Other      Ulcerative Colitis Other      Colon Polyps Other      Colon Cancer Other            SOCIAL HISTORY:  I have reviewed this patient's social history.    Social History     Socioeconomic History     Marital status: Single   Tobacco Use     Smoking status: Never     Passive exposure: Never     Smokeless tobacco: Never   Vaping Use     Vaping status: Never Used   Substance and Sexual Activity     Alcohol use: No     Drug use: No     Sexual activity: Never     Social Drivers of Health     Financial Resource Strain: Low Risk  (2/11/2025)    Financial Resource Strain      Within the past 12 months, have you or your family members you live with been unable to get utilities (heat, electricity) when it was really needed?: No   Food Insecurity: Low Risk  (2/11/2025)    Food Insecurity      Within the past 12 months, did you worry that your food would run out before you got money to buy more?: No      Within the past 12 months, did the food you bought just not last and you didn t have money to get more?: No   Transportation Needs: Low Risk  (2/11/2025)    Transportation Needs      Within the past 12 months, has lack of transportation kept you from medical appointments, getting your medicines, non-medical meetings or appointments, work, or from getting things that you need?: No   Physical Activity: Insufficiently Active (5/28/2024)    Received from [facility]    Exercise Vital Sign      Days of Exercise per Week: 5 days      Minutes of Exercise per Session: 20 min   Interpersonal Safety: Low Risk  (5/17/2024)    Interpersonal Safety      Do you  "feel physically and emotionally safe where you currently live?: Yes      Within the past 12 months, have you been hit, slapped, kicked or otherwise physically hurt by someone?: No      Within the past 12 months, have you been humiliated or emotionally abused in other ways by your partner or ex-partner?: No   Housing Stability: Low Risk  (2/11/2025)    Housing Stability      Do you have housing? : Yes      Are you worried about losing your housing?: No         CURRENT MEDICATIONS:  I have reviewed this patient's current medications.    Current Outpatient Medications   Medication Sig Dispense Refill     acetaminophen (TYLENOL) 325 MG tablet Take 2 tablets (650 mg) 30 minutes before injecting Humira. 12 tablet 1     BANOPHEN 25 MG capsule TAKE 1 CAPSULE BY MOUTH 30 MINUTES BEFORE INJECTING HUMIRA       COMPOUNDED NON-CONTROLLED SUBSTANCE (CMPD RX) - PHARMACY TO MIX COMPOUNDED MEDICATION Apply 20 mLs into each nare 2 times daily Gentamicin/Dexamethasone 160/120mg/liter saline 1000 mL 12     cyanocobalamin (CYANOCOBALAMIN) 1000 mcg/mL injection Inject 1,000 mcg into the muscle every 30 days. (Patient taking differently: Inject into the muscle. Pt states getting every two weeks. Unsure of dose.)       dicyclomine (BENTYL) 10 MG capsule Take 1 capsule (10 mg) by mouth 4 times daily as needed (abdominal pain). Slowly wean off as directed. 120 capsule 3     diphenhydrAMINE (BENADRYL) 25 MG tablet Take 1 tablet (25 mg) 30 minutes before injecting Humira 25 tablet 1     Emergency Supply Kit, PIV, Patient use for emergency only. Contents: 3 sodium chloride 0.9% flushes, 1 IV start kit, 1 microclave ext set 14\", 1 each IV Cath 22 G/1\" and 24G/3/4\", 6 alcohol prep pads, 4 nitrile gloves (med). Call [phone] to reorder. 333552 kit 0     EPINEPHrine (ANY BX GENERIC EQUIV) 0.3 MG/0.3ML injection 2-pack Inject 0.3 mLs (0.3 mg) into the muscle as needed for anaphylaxis May repeat one time in 5-15 minutes if response to initial " dose is inadequate. 2 each 0     famotidine (PEPCID) 20 MG tablet Take 1 tablet (20 mg) by mouth At Bedtime 30 tablet 3     fluocinonide (LIDEX) 0.05 % external ointment Apply topically 2 times daily Apply as directed 60 g 0     FLUoxetine (PROZAC) 10 MG capsule Take 1 capsule (10 mg) by mouth daily 90 capsule 2     FLUoxetine (PROZAC) 20 MG capsule Take 20 mg with 10 mg capsule (for total of 30 mg) for 4 weeks, then taper to 20 mg daily 60 capsule 0     FLUoxetine (PROZAC) 40 MG capsule Take 1 capsule (40 mg) by mouth daily 90 capsule 2     gentamicin (GARAMYCIN) 0.1 % external ointment Apply topically 2 times daily Mixed with lidex ointment to areas of rash 30 g 0     HUMIRA, 2 PEN, 40 MG/0.4ML pen kit Inject 0.4 mLs (40 mg) subcutaneously every 14 days. 0.8 mL 5     hydrOXYzine HCl (ATARAX) 10 MG tablet Take 2 tablets (20 mg) by mouth at bedtime 180 tablet 2     mometasone (ELOCON) 0.1 % external ointment Apply twice daily for 2 weeks for sun rash on chest and arms and ears 45 g 3     norethindrone-ethinyl estradiol (MICROGESTIN 1.5/30) 1.5-30 MG-MCG tablet Take 1 tablet by mouth daily . Active tabs only, skip placebo pills. Take continuously. 84 tablet 4     ondansetron (ZOFRAN ODT) 4 MG ODT tab Take 1 tablet (4 mg) by mouth every 8 hours as needed for nausea 12 tablet 3     pantoprazole (PROTONIX) 40 MG EC tablet Take 1 tablet (40 mg) by mouth daily. 90 tablet 0     senna (SENNA LAX) 8.6 MG tablet Take 1 tablet by mouth daily 30 tablet 5     sodium chloride 0.9 % in 1,000 mL via gravity infusion Infuse over 2-4 hours into the vein once a week. Infuse 1 bag(s) (1000 mL). Each bag to infuse over 2-4 hours via gravity 726406 mL 0     sodium chloride, PF, 0.9% PF flush Inject 10 mLs into the vein as needed for line flush. Flush IV before and after medication administration as directed and/or at least every 12 hours. 011552 mL 0     tacrolimus (PROTOPIC) 0.1 % external ointment Apply topically.       VTAMA 1 % CREA  "Apply 1 Application topically daily as needed Stop for pregnancy 60 g 11         ALLERGIES/SENSITIVITIES:  I have reviewed this patient's allergies.     Allergies   Allergen Reactions     Gluten Meal Anaphylaxis     Throat gets sores and swelling    Other reaction(s): GI Upset   Sensitivity, avoiding    Sensitivity, avoiding      Other reaction(s): GI Upset Sensitivity, avoiding     Infliximab Anaphylaxis     Pt will still be getting it. Give pre-meds and give over 2 hours       Other Drug Allergy (See Comments) GI Disturbance     Sorbitol-Mannitol-Xanthan Gum - Patient experienced nausea, dizziness, and felt all over body \"tingling\" - discontinued Breeza and continued with water  2/12/2025     Tamiflu [Oseltamivir] Rash         PHYSICAL EXAM: Ht 1.575 m (5' 2\")   Wt 51.9 kg (114 lb 6.4 oz)   SpO2 97%   BMI 20.92 kg/m    Orthostatics:  Lying down: 130/86 with heart rate 99  Sitting up: 132/91 with heart rate 98  Standing up: 126/89 with heart rate 108    GENERAL APPEARANCE: No acute distress, awake, alert.    HEENT: No scleral icterus or conjuctival hemorrhage, mucous membranes are moist.    NECK: Supple without thyromegaly. No carotid bruits are present. Normal jugular venous pressure.    CVS: Regular rate and rhythm with normal S1 and S2, no S3 or S4 gallop is present.    LUNG: Clear to auscultation without crackles or wheezes. Respiratory effort is normal.    GASTROINTESTINAL: Abdomen is soft and non-tender with normal bowel sounds.    EXTREMITIES: No clubbing or cyanosis. No edema. Normal and symmetric pulses.    PSYCHIATRIC: Normal affect.    NEUROLOGIC: Alert and appropriate. No obvious focal deficits.      LABS AND DIAGNOSTICS:    CBC:  Lab Results   Component Value Date    WBC 6.6 04/04/2025    WBC 6.5 05/22/2021    RBC 4.35 04/04/2025    RBC 4.76 05/22/2021    HGB 12.7 04/04/2025    HGB 13.6 05/22/2021    HCT 37.7 04/04/2025    HCT 41.6 05/22/2021    MCV 87 04/04/2025    MCV 87 05/22/2021    MCH 29.2 " 04/04/2025    MCH 28.6 05/22/2021    MCHC 33.7 04/04/2025    MCHC 32.7 05/22/2021    RDW 12.6 04/04/2025    RDW 11.9 05/22/2021     04/04/2025     05/22/2021       BMP:  Lab Results   Component Value Date     04/04/2025     05/22/2021    POTASSIUM 3.5 04/04/2025    POTASSIUM 4.0 04/01/2023    POTASSIUM 4.2 05/22/2021    CHLORIDE 105 04/04/2025    CHLORIDE 110 04/01/2023    CHLORIDE 107 05/22/2021    CO2 23 04/04/2025    CO2 24 04/01/2023    CO2 27 05/22/2021    ANIONGAP 11 04/04/2025    ANIONGAP 4 04/01/2023    ANIONGAP 3 05/22/2021    GLC 93 04/04/2025     (H) 04/01/2023    GLC 85 05/22/2021    BUN 8.2 04/04/2025    BUN 5 (L) 04/01/2023    BUN 11 05/22/2021    CR 0.62 04/04/2025    CR 0.66 05/22/2021    GFRESTIMATED >90 04/04/2025    GFRESTIMATED GFR not calculated, patient <18 years old. 05/22/2021    GFRESTBLACK GFR not calculated, patient <18 years old. 05/22/2021    CHRISTINA 9.4 04/04/2025    CHRISTINA 9.7 05/22/2021       LIPIDS:  Lab Results   Component Value Date    CHOL 183 (H) 02/03/2021    HDL 57 02/03/2021     (H) 02/03/2021    TRIG 82 02/03/2021       LFT:  Lab Results   Component Value Date    PROTTOTAL 7.1 04/04/2025    PROTTOTAL 7.8 05/22/2021    ALBUMIN 4.1 04/04/2025    ALBUMIN 3.4 04/01/2023    ALBUMIN 4.4 05/22/2021    BILITOTAL 1.6 (H) 04/04/2025    BILITOTAL 2.0 (H) 05/22/2021    ALKPHOS 39 (L) 04/04/2025    ALKPHOS 81 05/22/2021    AST 16 04/04/2025    AST 16 05/22/2021    ALT 9 04/04/2025    ALT 20 05/22/2021       TFT:  Lab Results   Component Value Date    TSH 0.64 01/21/2021    T4 0.79 11/12/2018       HgbA1c:   Lab Results   Component Value Date    A1C 4.9 05/30/2018       INR RESULTS:  Lab Results   Component Value Date    INR 1.11 04/02/2025       ECG (personally reviewed):  5/1/2025:  Normal sinus rhythm at 94 bpm.  No delta wave.  Normal QTc.  10/17/2021: Sinus tachycardia 101 bpm.      Thermoregulatory sweat test 9/26/2024 ([facility]):   Normal  thermoregulatory sweat test.     Autonomic reflex screen 11/29/2024 ([facility]):  Normal, limited study. Quantitative sudomotor Axon reflex test (Q-Sweat   values) is normal at all sites.  Compared to the prior study of 9/27/2024,   the previously seen impairment is resolved suggesting prior medication   effect.     Autonomic reflex screen 9/27/2024 ([facility]):  There is evidence of non-length dependent postganglionic sympathetic   sudomotor with preserved cardiovagal and cardiovascular adrenergic   function. With the normal thermoregulatory sweat test, these findings are   of uncertain significance. Medication effects (hydroxyzine, Linzess) are   likely contributing to the QSART results.     Echocardiogram:  Not performed.          IMPRESSION AND PLAN: Jaimie Ortiz is a 19 year old female with past medical history pertinent for Crohn's disease, Gilbert's syndrome, lifelong dysphagia, gastroesophageal reflux disease, irritable bowel syndrome, generalized anxiety disorder, autism spectrum disorder, OCD, and several noncardiac comorbidities who is referred for POTS evaluation.     Orthostatic intolerance: She describes symptoms of orthostatic intolerance; evaluation negative for POTS to date; in particular tilt table test, autonomic reflex test, and thermoregulatory sweat test within normal limits.  Today in the office she exhibited a heart rate increase of only 8 bpm with no change in blood pressure with orthostatic measurements.  Nonetheless she clearly describes orthostatic intolerance; she was asking me to endorse a PICC line for continued administration of IV fluids.  I do not think is a good idea for multiple reasons.  She has already had complications with frequent IV sticks/IV fluids with scarring of her veins in both arms.  A PICC line would expose her to the risk of central infection; given her young age it is really a path to nowhere; any volume increase would be only temporary and long-term  complication risks would escalate over the years.  I encouraged her to proceed with her [facility] POTS evaluation.  We discussed conservative measures and also medical measures such as fludrocortisone if felt appropriate by her [facility] team.  We had an extensive discussion over 60 minutes.          Thank you for the opportunity to participate in the care of this patient. Please do not hesitate to contact me with any questions or concerns.    CC: ASHLIE Yoo CNP  [address] [place] [address]    Today's clinic visit entailed:    60 minutes spent by me on the date of the encounter doing chart review, review of outside records, review of test results, interpretation of tests, patient visit, and documentation   Provider  Link to [facility] Help Grid     The level of medical decision making during this visit was of moderate complexity.      Nik Cruz MD, FACC, FASE, [facility].      Thank you for allowing me to participate in the care of your patient.      Sincerely,     Nik Cruz MD     [facility]  cc:   ASHLIE Yoo CNP  [address] [place] [address]

## 2025-05-02 ENCOUNTER — TELEPHONE (OUTPATIENT)
Dept: GASTROENTEROLOGY | Facility: CLINIC | Age: 20
End: 2025-05-02

## 2025-05-02 ENCOUNTER — HOSPITAL ENCOUNTER (EMERGENCY)
Facility: CLINIC | Age: 20
Discharge: HOME | End: 2025-05-02
Attending: EMERGENCY MEDICINE | Admitting: EMERGENCY MEDICINE
Payer: COMMERCIAL

## 2025-05-02 VITALS
RESPIRATION RATE: 16 BRPM | HEART RATE: 99 BPM | DIASTOLIC BLOOD PRESSURE: 77 MMHG | TEMPERATURE: 97.6 F | OXYGEN SATURATION: 99 % | SYSTOLIC BLOOD PRESSURE: 120 MMHG

## 2025-05-02 DIAGNOSIS — T78.2XXA ANAPHYLAXIS, INITIAL ENCOUNTER: ICD-10-CM

## 2025-05-02 PROCEDURE — 96361 HYDRATE IV INFUSION ADD-ON: CPT | Performed by: EMERGENCY MEDICINE

## 2025-05-02 PROCEDURE — 250N000011 HC RX IP 250 OP 636: Mod: JZ | Performed by: EMERGENCY MEDICINE

## 2025-05-02 PROCEDURE — 96374 THER/PROPH/DIAG INJ IV PUSH: CPT | Performed by: EMERGENCY MEDICINE

## 2025-05-02 PROCEDURE — 99284 EMERGENCY DEPT VISIT MOD MDM: CPT | Performed by: EMERGENCY MEDICINE

## 2025-05-02 PROCEDURE — 96372 THER/PROPH/DIAG INJ SC/IM: CPT | Mod: XS | Performed by: EMERGENCY MEDICINE

## 2025-05-02 PROCEDURE — 258N000003 HC RX IP 258 OP 636: Performed by: EMERGENCY MEDICINE

## 2025-05-02 PROCEDURE — 99285 EMERGENCY DEPT VISIT HI MDM: CPT | Mod: 25 | Performed by: EMERGENCY MEDICINE

## 2025-05-02 PROCEDURE — 250N000009 HC RX 250: Performed by: EMERGENCY MEDICINE

## 2025-05-02 PROCEDURE — 96375 TX/PRO/DX INJ NEW DRUG ADDON: CPT | Performed by: EMERGENCY MEDICINE

## 2025-05-02 RX ORDER — METHYLPREDNISOLONE SODIUM SUCCINATE 125 MG/2ML
125 INJECTION INTRAMUSCULAR; INTRAVENOUS ONCE
Status: COMPLETED | OUTPATIENT
Start: 2025-05-02 | End: 2025-05-02

## 2025-05-02 RX ORDER — DIPHENHYDRAMINE HYDROCHLORIDE 50 MG/ML
25 INJECTION, SOLUTION INTRAMUSCULAR; INTRAVENOUS ONCE
Status: COMPLETED | OUTPATIENT
Start: 2025-05-02 | End: 2025-05-02

## 2025-05-02 RX ADMIN — DIPHENHYDRAMINE HYDROCHLORIDE 25 MG: 50 INJECTION, SOLUTION INTRAMUSCULAR; INTRAVENOUS at 20:09

## 2025-05-02 RX ADMIN — METHYLPREDNISOLONE SODIUM SUCCINATE 125 MG: 125 INJECTION, POWDER, FOR SOLUTION INTRAMUSCULAR; INTRAVENOUS at 20:08

## 2025-05-02 RX ADMIN — SODIUM CHLORIDE 1000 ML: 0.9 INJECTION, SOLUTION INTRAVENOUS at 20:05

## 2025-05-02 RX ADMIN — EPINEPHRINE 0.3 MG: 1 INJECTION INTRAMUSCULAR; INTRAVENOUS; SUBCUTANEOUS at 20:06

## 2025-05-02 RX ADMIN — FAMOTIDINE 20 MG: 10 INJECTION, SOLUTION INTRAVENOUS at 20:09

## 2025-05-02 ASSESSMENT — COLUMBIA-SUICIDE SEVERITY RATING SCALE - C-SSRS
2. HAVE YOU ACTUALLY HAD ANY THOUGHTS OF KILLING YOURSELF IN THE PAST MONTH?: NO
6. HAVE YOU EVER DONE ANYTHING, STARTED TO DO ANYTHING, OR PREPARED TO DO ANYTHING TO END YOUR LIFE?: NO
1. IN THE PAST MONTH, HAVE YOU WISHED YOU WERE DEAD OR WISHED YOU COULD GO TO SLEEP AND NOT WAKE UP?: NO

## 2025-05-02 ASSESSMENT — ACTIVITIES OF DAILY LIVING (ADL)
ADLS_ACUITY_SCORE: 48

## 2025-05-02 NOTE — TELEPHONE ENCOUNTER
PA Initiation    Medication: HUMIRA (2 PEN) 40 MG/0.4ML SC AJKT  Insurance Company: [employer] - Phone [phone] Fax [phone]  Pharmacy Filling the Rx: [facility] - [address] [facility]  Filling Pharmacy Phone:    Filling Pharmacy Fax:    Start Date: 5/2/2025  [id number]

## 2025-05-03 NOTE — ED TRIAGE NOTES
Patient presents to ED stating she ate gluten 10 min ago prior to arrival and stating it feels like her throat is closing. Patients throat sounds raspy. MD called to room 2 to assess.

## 2025-05-03 NOTE — ED PROVIDER NOTES
ED Provider Note  [facility]      History     Chief Complaint   Patient presents with    Allergic Reaction     HPI  Jaimie Ortiz is a 19 year old female who has a history of Crohn's disease, currently on infliximab, allergic rhinitis, chronic tonsillitis, allergy to gluten, with previous history of anaphylaxis who presents to the emergency department after reportedly accidentally ingesting gluten.  Patient states that about 10 minutes prior to arrival she had some chips which were covered in malt.  She was unaware of this but almost immediately after eating the chips she noted that she felt like her throat was swollen.  She has had this in the past with episodes of anaphylaxis.  She denies any swelling of the lips or tongue but states that her throat feels full and her voice is raspy.  Denies any shortness of breath or wheezing.  Denies any itching or rash.  No chest pain, no abdominal pain, no nausea, vomiting, or diarrhea.  Patient took 25 mg of Benadryl immediately and came to the emergency department.  She does have an EpiPen but did not take it as she stated that she feels like she can still breathe and she was not certain if she should take her EpiPen.    Record review shows that the patient has a previous similar history prior to taking infliximab, however is premedicated prior to taking her infliximab.    Past Medical History:   Diagnosis Date    Allergy to mold spores     12/9/13 skin tests pos. only for M/W only    Anxiety     Crohn's disease (H)     Diagnostic skin and sensitization tests 12/9/13 skin tests pos. only for M/W only    HSP (Hen[place] Schonlein purpura) 12/2007    resolved    Other and unspecified noninfectious gastroenteritis and colitis(558.9) 05/20/2009    Seasonal allergic rhinitis        Past Surgical History:   Procedure Laterality Date    CAPSULE/PILL CAM ENDOSCOPY N/A 02/03/2021    Procedure: VIDEO CAPSULE ENDOSCOPY;  Surgeon: Marily Lane MD;  Location:  [facility]     COLONOSCOPY N/A 12/16/2020    Procedure: COLONOSCOPY, WITH POLYPECTOMY AND BIOPSY;  Surgeon: Marily Lane MD;  Location: [facility]     COLONOSCOPY N/A 07/13/2021    Procedure: COLONOSCOPY, WITH POLYPECTOMY AND BIOPSY;  Surgeon: Marily Lane MD;  Location: [facility]     COLONOSCOPY N/A 08/01/2022    Procedure: COLONOSCOPY, WITH POLYPECTOMY AND BIOPSY;  Surgeon: Marily Lane MD;  Location: [facility]     ESOPHAGOSCOPY, GASTROSCOPY, DUODENOSCOPY (EGD), COMBINED N/A 12/16/2020    Procedure: upper endoscopy, WITH BIOPSY;  Surgeon: Marily Lane MD;  [facility]     ESOPHAGOSCOPY, GASTROSCOPY, DUODENOSCOPY (EGD), COMBINED N/A 07/13/2021    Procedure: ESOPHAGOGASTRODUODENOSCOPY, WITH BIOPSY;  Surgeon: Marily Lane MD;  [facility]: [facility]     ESOPHAGOSCOPY, GASTROSCOPY, DUODENOSCOPY (EGD), COMBINED N/A 08/01/2022    Procedure: ESOPHAGOGASTRODUODENOSCOPY, WITH BIOPSY;  Surgeon: Marily Lane MD;  Location: [facility]     ORTHOPEDIC SURGERY  2010    Trigger thumb    TONSILLECTOMY, ADENOIDECTOMY, COMBINED Bilateral 12/19/2019    Procedure: Bilateral TONSILLECTOMY, possible adenoidectomy;  Surgeon: Markus Rojas MD;  Location: [facility]       Family History   Problem Relation Age of Onset    Eye Disorder Mother     Brain Cancer Father     Breast Cancer Maternal Grandmother     Hypertension Maternal Grandfather     Hypertension Paternal Grandmother     Crohn's Disease Other     Ulcerative Colitis Other     Colon Polyps Other     Colon Cancer Other        Social History     Tobacco Use    Smoking status: Never     Passive exposure: Never    Smokeless tobacco: Never   Substance Use Topics    Alcohol use: No         Past Medical History  Past Medical History:   Diagnosis Date    Allergy to mold spores     12/9/13 skin tests pos. only for M/W only    Anxiety     Crohn's disease (H)     Diagnostic skin and sensitization tests 12/9/13 skin tests pos. only for M/W  only    HSP (Hen[place] Schonlein purpura) 12/2007    resolved    Other and unspecified noninfectious gastroenteritis and colitis(558.9) 05/20/2009    Seasonal allergic rhinitis      Past Surgical History:   Procedure Laterality Date    CAPSULE/PILL CAM ENDOSCOPY N/A 02/03/2021    Procedure: VIDEO CAPSULE ENDOSCOPY;  Surgeon: Marily Lane MD;  [facility]     COLONOSCOPY N/A 12/16/2020    Procedure: COLONOSCOPY, WITH POLYPECTOMY AND BIOPSY;  Surgeon: Marily Lane MD;  [facility]     COLONOSCOPY N/A 07/13/2021    Procedure: COLONOSCOPY, WITH POLYPECTOMY AND BIOPSY;  Surgeon: Marily Lane MD;  [facility]     COLONOSCOPY N/A 08/01/2022    Procedure: COLONOSCOPY, WITH POLYPECTOMY AND BIOPSY;  Surgeon: Marily Lane MD;  [facility]     ESOPHAGOSCOPY, GASTROSCOPY, DUODENOSCOPY (EGD), COMBINED N/A 12/16/2020    Procedure: upper endoscopy, WITH BIOPSY;  Surgeon: Marily Lane MD;  [facility]     ESOPHAGOSCOPY, GASTROSCOPY, DUODENOSCOPY (EGD), COMBINED N/A 07/13/2021    Procedure: ESOPHAGOGASTRODUODENOSCOPY, WITH BIOPSY;  Surgeon: Marily Lane MD;  [facility]     ESOPHAGOSCOPY, GASTROSCOPY, DUODENOSCOPY (EGD), COMBINED N/A 08/01/2022    Procedure: ESOPHAGOGASTRODUODENOSCOPY, WITH BIOPSY;  Surgeon: Marily Lane MD;  [facility]     ORTHOPEDIC SURGERY  2010    Trigger thumb    TONSILLECTOMY, ADENOIDECTOMY, COMBINED Bilateral 12/19/2019    Procedure: Bilateral TONSILLECTOMY, possible adenoidectomy;  Surgeon: Markus Rojas MD;  Location: [facility]     acetaminophen (TYLENOL) 325 MG tablet  BANOPHEN 25 MG capsule  COMPOUNDED NON-CONTROLLED SUBSTANCE (CMPD RX) - PHARMACY TO MIX COMPOUNDED MEDICATION  cyanocobalamin (CYANOCOBALAMIN) 1000 mcg/mL injection  dicyclomine (BENTYL) 10 MG capsule  diphenhydrAMINE (BENADRYL) 25 MG tablet  Emergency Supply Kit, PIV,  EPINEPHrine (ANY BX GENERIC EQUIV) 0.3 MG/0.3ML injection  "2-pack  famotidine (PEPCID) 20 MG tablet  fluocinonide (LIDEX) 0.05 % external ointment  FLUoxetine (PROZAC) 10 MG capsule  FLUoxetine (PROZAC) 20 MG capsule  FLUoxetine (PROZAC) 40 MG capsule  gentamicin (GARAMYCIN) 0.1 % external ointment  HUMIRA, 2 PEN, 40 MG/0.4ML pen kit  hydrOXYzine HCl (ATARAX) 10 MG tablet  mometasone (ELOCON) 0.1 % external ointment  norethindrone-ethinyl estradiol (MICROGESTIN 1.5/30) 1.5-30 MG-MCG tablet  ondansetron (ZOFRAN ODT) 4 MG ODT tab  pantoprazole (PROTONIX) 40 MG EC tablet  senna (SENNA LAX) 8.6 MG tablet  sodium chloride 0.9 % in 1,000 mL via gravity infusion  sodium chloride, PF, 0.9% PF flush  tacrolimus (PROTOPIC) 0.1 % external ointment  VTAMA 1 % CREA      Allergies   Allergen Reactions    Gluten Meal Anaphylaxis     Throat gets sores and swelling    Other reaction(s): GI Upset   Sensitivity, avoiding    Sensitivity, avoiding      Other reaction(s): GI Upset Sensitivity, avoiding    Infliximab Anaphylaxis     Pt will still be getting it. Give pre-meds and give over 2 hours      Other Drug Allergy (See Comments) GI Disturbance     Sorbitol-Mannitol-Xanthan Gum - Patient experienced nausea, dizziness, and felt all over body \"tingling\" - discontinued Breeza and continued with water  2/12/2025    Tamiflu [Oseltamivir] Rash     Family History  Family History   Problem Relation Age of Onset    Eye Disorder Mother     Brain Cancer Father     Breast Cancer Maternal Grandmother     Hypertension Maternal Grandfather     Hypertension Paternal Grandmother     Crohn's Disease Other     Ulcerative Colitis Other     Colon Polyps Other     Colon Cancer Other      Social History   Social History     Tobacco Use    Smoking status: Never     Passive exposure: Never    Smokeless tobacco: Never   Vaping Use    Vaping status: Never Used   Substance Use Topics    Alcohol use: No    Drug use: No      A medically appropriate review of systems was performed with pertinent positives and negatives " noted in the HPI, and all other systems negative.    Physical Exam   BP: (!) 162/93  Pulse: 104  Temp: 97.7  F (36.5  C)  SpO2: 99 %  Physical Exam  Vitals and nursing note reviewed.   Constitutional:       General: She is not in acute distress.     Appearance: She is not diaphoretic.      Comments: Thin adult female, lying back in bed, alert, cooperative, no acute distress   HENT:      Head: Atraumatic.      Mouth/Throat:      Mouth: Mucous membranes are moist.      Pharynx: Oropharynx is clear. No oropharyngeal exudate.      Comments: Slightly raspy voice.  No objective swelling of lips, tongue, or oropharynx, no stridor or pooling of secretions  Eyes:      General: No scleral icterus.     Pupils: Pupils are equal, round, and reactive to light.   Cardiovascular:      Rate and Rhythm: Normal rate.      Pulses: Normal pulses.      Heart sounds: Normal heart sounds. No murmur heard.  Pulmonary:      Effort: No respiratory distress.      Breath sounds: Normal breath sounds.   Abdominal:      General: Bowel sounds are normal.      Palpations: Abdomen is soft.      Tenderness: There is no abdominal tenderness.   Musculoskeletal:         General: No tenderness.   Skin:     General: Skin is warm.      Findings: No rash.   Neurological:      Mental Status: She is alert.         ED Course, Procedures, & Data      Procedures                 No results found for any visits on 05/02/25.  Medications   EPINEPHrine (ADRENALIN) kit 0.3 mg (0.3 mg Subcutaneous $Given 5/2/25 2006)   famotidine (PEPCID) injection 20 mg (20 mg Intravenous $Given 5/2/25 2009)   methylPREDNISolone Na Suc (solu-MEDROL) injection 125 mg (125 mg Intravenous $Given 5/2/25 2008)   sodium chloride 0.9% BOLUS 1,000 mL (0 mLs Intravenous Stopped 5/2/25 8565)   diphenhydrAMINE (BENADRYL) injection 25 mg (25 mg Intravenous $Given 5/2/25 2009)     Labs Ordered and Resulted from Time of ED Arrival to Time of ED Departure - No data to display  No orders to display           Goal care: Based on patient's evaluation, she is critically ill and does require critical care.  Approximate 30 minutes is spent in assessment and reassessment, record review, discussion with patient and family, and documentation.      Assessment & Plan    Patient presents to the emergency department today for the above complaints.  She has a known allergy to gluten and has had anaphylaxis in the past.  Presenting 10 minutes after accidentally being exposed to gluten when she ate chips.  She does not have any objective evidence of swelling of the lips or tongue, but does feel subjective swelling of the throat and feels like her voice is a bit raspy.  Voice does appear to be a little bit raspy.    She is hemodynamically stable, vital signs show a blood pressure of 162/93 with a heart rate of 107, room air oxygen saturation is 100.    Based on previous history and symptoms of throat tightness in the setting of exposure to known allergen, we did give her epinephrine 0.3 mg IM x 1, Solu-Medrol 125 mg IV x 1, Benadryl 25 mg IV x 1 (patient had taken 25 mg of oral Benadryl prior to arrival), and famotidine 20 mg IV x 1 in addition to a liter of normal saline.  She was monitored here in the emergency department.    Patient was reevaluated at several points during her emergency department stay and had rapid improvement of her throat symptoms after administration of epi.  She is complaining of feeling jittery and anxious, which we reassured her is normal after epinephrine.  She was wrapped in warm blankets to help with this.    Patient had no recurrence of symptoms here in the emergency department.  She is comfortable going home, is planning to go home to her mother's house and out to her apartment this evening.  She should follow-up with her primary clinic as needed, we did discuss the concept of biphasic reactions with her and she knows to watch out for this and to return if she is noticing any of the symptoms.   She has EpiPen's with her and does not need a new prescription at this time.    I have reviewed the nursing notes. I have reviewed the findings, diagnosis, plan and need for follow up with the patient.    New Prescriptions    No medications on file       Final diagnoses:   Anaphylaxis, initial encounter       Sharita Harden MD  [facility] EMERGENCY DEPARTMENT  5/2/2025     Sharita Harden MD  05/02/25 [id number]

## 2025-05-03 NOTE — DISCHARGE INSTRUCTIONS
You have been seen in the emergency department today for anaphylaxis which is a severe allergic reaction.  We have treated you with antihistamines as well as epinephrine.  You have not had any recurrence of symptoms here in the emergency department.    We would advise that you take Benadryl 25 mg every 6 hours for the next today.  If you are noticing recurrence of severe symptoms such as swelling of the throat, tongue, or lips, or if you have severe difficulty breathing immediately give yourself your EpiPen and come right back to the emergency department.    Please follow-up with your primary clinic as normally scheduled.

## 2025-05-06 NOTE — TELEPHONE ENCOUNTER
Prior Authorization Approval    Medication: HUMIRA (2 PEN) 40 MG/0.4ML SC AJKT  Authorization Effective Date: 4/2/2025  Authorization Expiration Date: 5/2/2026  Approved Dose/Quantity: ud  Reference #: [id number]   Insurance Company: [employer] - Phone [phone] Fax [phone]  Expected CoPay: $    CoPay Card Available:      Financial Assistance Needed:    Which Pharm[facility] is filling the prescription: [facility] - [address] [place] [address]  Pharmacy Notified:    Patient Notified:  renewal

## 2025-05-07 PROCEDURE — S9374 HIT HYDRA 1 LITER DIEM: HCPCS

## 2025-05-09 ENCOUNTER — TRANSFERRED RECORDS (OUTPATIENT)
Dept: HEALTH INFORMATION MANAGEMENT | Facility: CLINIC | Age: 20
End: 2025-05-09
Payer: COMMERCIAL

## 2025-05-12 ENCOUNTER — TELEPHONE (OUTPATIENT)
Dept: HOME HEALTH SERVICES | Facility: HOME HEALTH | Age: 20
End: 2025-05-12
Payer: COMMERCIAL

## 2025-05-13 ENCOUNTER — HOME CARE VISIT (OUTPATIENT)
Dept: HOME HEALTH SERVICES | Facility: HOME HEALTH | Age: 20
End: 2025-05-13
Payer: COMMERCIAL

## 2025-05-13 VITALS
HEART RATE: 86 BPM | OXYGEN SATURATION: 99 % | RESPIRATION RATE: 16 BRPM | SYSTOLIC BLOOD PRESSURE: 118 MMHG | BODY MASS INDEX: 20.12 KG/M2 | DIASTOLIC BLOOD PRESSURE: 80 MMHG | WEIGHT: 110 LBS | TEMPERATURE: 97.7 F

## 2025-05-13 PROCEDURE — 99601 HOME NFS VISIT <2 HRS: CPT

## 2025-05-13 NOTE — PROGRESS NOTES
Nursing Visit Note:  Nurse visit today for US PIV and iv fluids start for Jaimie ARMAS Angel.     present during visit today: Not Applicable.    Intravenous Access:  Peripheral IV placed.    PIV placed with US guidance.    Note: A&Ox4 with VSS on RA. Pt reports intermittent palpitations in the evening while resting this week. Denies pain/discomfort/SOB. Discussed hydration and its interaction with heart function and also when to seek medical attention. Pt reports she would be open to sq hydration therapy but has a lot of questions about specific infusion durations and total volumes.  Encouraged to reach out to ordering physician for more information. NS infusion started prior to writers departure. Pleasant and compliant with cares.     Saline administered: 10 (ml)    Supply Check:   Does the patient have all the supplies they need for the next visit?  No, Order placed for alcohol preps.     Next visit plan: pt wishes to reach out to schedule as she has several up coming appointments next week and is unsure of her exact schedule at this time.     Bryson Sibley RN 5/13/2025

## 2025-05-20 ENCOUNTER — TELEPHONE (OUTPATIENT)
Dept: GASTROENTEROLOGY | Facility: CLINIC | Age: 20
End: 2025-05-20
Payer: COMMERCIAL

## 2025-05-20 NOTE — TELEPHONE ENCOUNTER
MTM appointment cancelled, we made one more attempt to reschedule.     Routing back to referring provider and MTM Pharmacist Team    [facility]

## 2025-05-20 NOTE — TELEPHONE ENCOUNTER
Per chart review patient has transferred care to [place]    Myke Segura, PharmD, BCPS  MT Pharmacist   [facility]  Phone: [phone]

## 2025-05-21 ENCOUNTER — HOME INFUSION BILLING (OUTPATIENT)
Dept: HOME HEALTH SERVICES | Facility: HOME HEALTH | Age: 20
End: 2025-05-21
Payer: COMMERCIAL

## 2025-05-21 ENCOUNTER — HOME CARE VISIT (OUTPATIENT)
Dept: HOME HEALTH SERVICES | Facility: HOME HEALTH | Age: 20
End: 2025-05-21
Payer: COMMERCIAL

## 2025-05-21 PROCEDURE — S1015 IV TUBING EXTENSION SET: HCPCS

## 2025-05-21 PROCEDURE — S9374 HIT HYDRA 1 LITER DIEM: HCPCS

## 2025-05-21 NOTE — PROGRESS NOTES
Nursing Visit Note:  Nurse visit today for canceled visit for Jaimie Chenfranciscajordi.     present during visit today: Not Applicable.    Intravenous Access:  No Intravenous access/labs at this visit.      Note: while RN was in route to scheduled nurse visit,  called to notify RN that patient will need to cancel today s visit because patient did not receive IV fluids. Patient will call to reschedule when able.    Saline administered: 0 (ml)    Supply Check:   Does the patient have all the supplies they need for the next visit?  No, Order placed for patient to call regarding supply delivery of IV fluids.    Next visit plan: TBD. Patient will call to schedule for likely later this week.    Neymar Bledsoe RN 5/21/2025

## 2025-05-22 ENCOUNTER — HOME CARE VISIT (OUTPATIENT)
Dept: HOME HEALTH SERVICES | Facility: HOME HEALTH | Age: 20
End: 2025-05-22
Payer: COMMERCIAL

## 2025-05-22 ENCOUNTER — TELEPHONE (OUTPATIENT)
Dept: HOME HEALTH SERVICES | Facility: HOME HEALTH | Age: 20
End: 2025-05-22
Payer: COMMERCIAL

## 2025-05-22 VITALS
TEMPERATURE: 97.7 F | OXYGEN SATURATION: 98 % | RESPIRATION RATE: 16 BRPM | SYSTOLIC BLOOD PRESSURE: 112 MMHG | HEART RATE: 83 BPM | DIASTOLIC BLOOD PRESSURE: 70 MMHG

## 2025-05-22 RX ORDER — GABAPENTIN 300 MG/1
300 CAPSULE ORAL 2 TIMES DAILY
COMMUNITY

## 2025-05-23 ENCOUNTER — NURSE TRIAGE (OUTPATIENT)
Dept: NURSING | Facility: CLINIC | Age: 20
End: 2025-05-23

## 2025-05-23 ENCOUNTER — HOSPITAL ENCOUNTER (EMERGENCY)
Facility: CLINIC | Age: 20
Discharge: HOME | End: 2025-05-23
Attending: EMERGENCY MEDICINE | Admitting: EMERGENCY MEDICINE
Payer: COMMERCIAL

## 2025-05-23 VITALS
DIASTOLIC BLOOD PRESSURE: 83 MMHG | TEMPERATURE: 97.9 F | HEART RATE: 89 BPM | SYSTOLIC BLOOD PRESSURE: 115 MMHG | OXYGEN SATURATION: 98 % | RESPIRATION RATE: 14 BRPM

## 2025-05-23 DIAGNOSIS — R00.2 PALPITATIONS: ICD-10-CM

## 2025-05-23 LAB
ALBUMIN SERPL BCG-MCNC: 4.4 G/DL (ref 3.5–5.2)
ALP SERPL-CCNC: 53 U/L (ref 40–150)
ALT SERPL W P-5'-P-CCNC: 10 U/L (ref 0–50)
ANION GAP SERPL CALCULATED.3IONS-SCNC: 11 MMOL/L (ref 7–15)
AST SERPL W P-5'-P-CCNC: 23 U/L (ref 0–35)
BASOPHILS # BLD AUTO: 0 10E3/UL (ref 0–0.2)
BASOPHILS NFR BLD AUTO: 1 %
BILIRUB SERPL-MCNC: 2.1 MG/DL
BUN SERPL-MCNC: 6.1 MG/DL (ref 6–20)
CALCIUM SERPL-MCNC: 9.8 MG/DL (ref 8.8–10.4)
CHLORIDE SERPL-SCNC: 104 MMOL/L (ref 98–107)
CREAT SERPL-MCNC: 0.63 MG/DL (ref 0.51–0.95)
EGFRCR SERPLBLD CKD-EPI 2021: >90 ML/MIN/1.73M2
EOSINOPHIL # BLD AUTO: 0.3 10E3/UL (ref 0–0.7)
EOSINOPHIL NFR BLD AUTO: 4 %
ERYTHROCYTE [DISTWIDTH] IN BLOOD BY AUTOMATED COUNT: 12.3 % (ref 10–15)
GLUCOSE SERPL-MCNC: 104 MG/DL (ref 70–99)
HCG SERPL QL: NEGATIVE
HCO3 SERPL-SCNC: 24 MMOL/L (ref 22–29)
HCT VFR BLD AUTO: 44.6 % (ref 35–47)
HGB BLD-MCNC: 14.7 G/DL (ref 11.7–15.7)
IMM GRANULOCYTES # BLD: 0 10E3/UL
IMM GRANULOCYTES NFR BLD: 0 %
LYMPHOCYTES # BLD AUTO: 3.3 10E3/UL (ref 0.8–5.3)
LYMPHOCYTES NFR BLD AUTO: 46 %
MAGNESIUM SERPL-MCNC: 1.8 MG/DL (ref 1.7–2.3)
MCH RBC QN AUTO: 28.3 PG (ref 26.5–33)
MCHC RBC AUTO-ENTMCNC: 33 G/DL (ref 31.5–36.5)
MCV RBC AUTO: 86 FL (ref 78–100)
MONOCYTES # BLD AUTO: 0.6 10E3/UL (ref 0–1.3)
MONOCYTES NFR BLD AUTO: 9 %
NEUTROPHILS # BLD AUTO: 3 10E3/UL (ref 1.6–8.3)
NEUTROPHILS NFR BLD AUTO: 41 %
NRBC # BLD AUTO: 0 10E3/UL
NRBC BLD AUTO-RTO: 0 /100
PLATELET # BLD AUTO: 431 10E3/UL (ref 150–450)
POTASSIUM SERPL-SCNC: 3.7 MMOL/L (ref 3.4–5.3)
PROT SERPL-MCNC: 7.8 G/DL (ref 6.4–8.3)
RBC # BLD AUTO: 5.2 10E6/UL (ref 3.8–5.2)
SODIUM SERPL-SCNC: 139 MMOL/L (ref 135–145)
TSH SERPL DL<=0.005 MIU/L-ACNC: 0.78 UIU/ML (ref 0.5–4.3)
WBC # BLD AUTO: 7.3 10E3/UL (ref 4–11)

## 2025-05-23 PROCEDURE — 83735 ASSAY OF MAGNESIUM: CPT | Performed by: EMERGENCY MEDICINE

## 2025-05-23 PROCEDURE — 99284 EMERGENCY DEPT VISIT MOD MDM: CPT | Performed by: EMERGENCY MEDICINE

## 2025-05-23 PROCEDURE — 84703 CHORIONIC GONADOTROPIN ASSAY: CPT | Performed by: EMERGENCY MEDICINE

## 2025-05-23 PROCEDURE — 85025 COMPLETE CBC W/AUTO DIFF WBC: CPT | Performed by: EMERGENCY MEDICINE

## 2025-05-23 PROCEDURE — 82247 BILIRUBIN TOTAL: CPT | Performed by: EMERGENCY MEDICINE

## 2025-05-23 PROCEDURE — 36415 COLL VENOUS BLD VENIPUNCTURE: CPT | Performed by: EMERGENCY MEDICINE

## 2025-05-23 PROCEDURE — 96360 HYDRATION IV INFUSION INIT: CPT | Performed by: EMERGENCY MEDICINE

## 2025-05-23 PROCEDURE — 84443 ASSAY THYROID STIM HORMONE: CPT | Performed by: EMERGENCY MEDICINE

## 2025-05-23 PROCEDURE — 258N000003 HC RX IP 258 OP 636: Performed by: EMERGENCY MEDICINE

## 2025-05-23 PROCEDURE — 93005 ELECTROCARDIOGRAM TRACING: CPT | Performed by: EMERGENCY MEDICINE

## 2025-05-23 PROCEDURE — 93010 ELECTROCARDIOGRAM REPORT: CPT | Performed by: EMERGENCY MEDICINE

## 2025-05-23 RX ORDER — SODIUM CHLORIDE 9 MG/ML
INJECTION, SOLUTION INTRAVENOUS CONTINUOUS
Status: DISCONTINUED | OUTPATIENT
Start: 2025-05-23 | End: 2025-05-23 | Stop reason: HOSPADM

## 2025-05-23 RX ADMIN — SODIUM CHLORIDE 1000 ML: 0.9 INJECTION, SOLUTION INTRAVENOUS at 18:28

## 2025-05-23 ASSESSMENT — ACTIVITIES OF DAILY LIVING (ADL)
ADLS_ACUITY_SCORE: 48

## 2025-05-23 NOTE — ED PROVIDER NOTES
ED PROVIDER NOTE  [facility]  [place]      History     Chief Complaint   Patient presents with    Irregular Heart Beat     HPI  Jaimie Ortiz is a 19 year old female Crohn's patient who gets intermittent infusions with a home health nurse but does not have a PICC line and/or port.  Patient states she has been getting home infusions for the last year and states that last night when her nurse was there giving an infusion she noticed some irregular heartbeat.  Patient states that over the past 12 hours her iWatch has been indicating that she is in A-fib intermittently.  The patient denies any chest pain, denies any shortness of breath, denies any vomiting or diarrhea, and presents here to the ER for evaluation.  Patient states she has never been in A-fib before.    I have reviewed the Medications, Allergies, Past Medical and Surgical History, and Social History in the [employer] system.    Past Medical History:   Diagnosis Date    Allergy to mold spores     12/9/13 skin tests pos. only for M/W only    Anxiety     Crohn's disease (H)     Diagnostic skin and sensitization tests 12/9/13 skin tests pos. only for M/W only    HSP (Henoch Schonlein purpura) 12/2007    resolved    Other and unspecified noninfectious gastroenteritis and colitis(558.9) 05/20/2009    Seasonal allergic rhinitis        Past Surgical History:   Procedure Laterality Date    CAPSULE/PILL CAM ENDOSCOPY N/A 02/03/2021    Procedure: VIDEO CAPSULE ENDOSCOPY;  Surgeon: Marily Lane MD;  Location: [facility]     COLONOSCOPY N/A 12/16/2020    Procedure: COLONOSCOPY, WITH POLYPECTOMY AND BIOPSY;  Surgeon: Marily Lane MD;  Location: [facility]     COLONOSCOPY N/A 07/13/2021    Procedure: COLONOSCOPY, WITH POLYPECTOMY AND BIOPSY;  Surgeon: Marily Lane MD;  Location: [facility]     COLONOSCOPY N/A 08/01/2022    Procedure: COLONOSCOPY, WITH POLYPECTOMY AND BIOPSY;  Surgeon: Marily Lane MD;  Location: [facility]      ESOPHAGOSCOPY, GASTROSCOPY, DUODENOSCOPY (EGD), COMBINED N/A 12/16/2020    Procedure: upper endoscopy, WITH BIOPSY;  Surgeon: Marily Lane MD;  Location: [facility]     ESOPHAGOSCOPY, GASTROSCOPY, DUODENOSCOPY (EGD), COMBINED N/A 07/13/2021    Procedure: ESOPHAGOGASTRODUODENOSCOPY, WITH BIOPSY;  Surgeon: Marily Lane MD;  Location: [facility]     ESOPHAGOSCOPY, GASTROSCOPY, DUODENOSCOPY (EGD), COMBINED N/A 08/01/2022    Procedure: ESOPHAGOGASTRODUODENOSCOPY, WITH BIOPSY;  Surgeon: Marily Lane MD;  Location: [facility]     ORTHOPEDIC SURGERY  2010    Trigger thumb    TONSILLECTOMY, ADENOIDECTOMY, COMBINED Bilateral 12/19/2019    Procedure: Bilateral TONSILLECTOMY, possible adenoidectomy;  Surgeon: Markus Rojas MD;  Location: [facility]         Dose / Directions   acetaminophen 325 MG tablet  Commonly known as: TYLENOL  Used for: Crohn's disease of colon without complication (H)      Take 2 tablets (650 mg) 30 minutes before injecting Humira.  Quantity: 12 tablet  Refills: 1     COMPOUNDED NON-CONTROLLED SUBSTANCE - PHARMACY TO MIX COMPOUNDED MEDICATION  Commonly known as: CMPD RX  Used for: Chronic pansinusitis, Immunosuppression, Dysfunction of both eustachian tubes      Dose: 20 mL  Apply 20 mLs into each nare 2 times daily Gentamicin/Dexamethasone 160/120mg/liter saline  Quantity: 1000 mL  Refills: 12     cyanocobalamin 1000 mcg/mL injection  Commonly known as: CYANOCOBALAMIN      Dose: 1,000 mcg  Inject 1,000 mcg into the muscle every 30 days.  Refills: 0     dicyclomine 10 MG capsule  Commonly known as: BENTYL  Used for: Abdominal pain, generalized, Irritable bowel syndrome with both constipation and diarrhea, Gastritis with hemorrhage, unspecified chronicity, unspecified gastritis type, Esophagitis determined by biopsy, Constipation, unspecified constipation type      Dose: 10 mg  Take 1 capsule (10 mg) by mouth 4 times daily as needed (abdominal pain). Slowly wean off as  "directed.  Quantity: 120 capsule  Refills: 3     * diphenhydrAMINE 25 MG tablet  Commonly known as: BENADRYL  Used for: Crohn's disease of colon without complication (H)      Take 1 tablet (25 mg) 30 minutes before injecting Humira  Quantity: 25 tablet  Refills: 1     * Banophen 25 MG capsule  Generic drug: diphenhydrAMINE      TAKE 1 CAPSULE BY MOUTH 30 MINUTES BEFORE INJECTING HUMIRA  Refills: 0     Emergency Supply Kit (PIV)  Used for: Crohn's disease of colon without complication (H)      Dose: 1 kit  Patient use for emergency only. Contents: 3 sodium chloride 0.9% flushes, 1 IV start kit, 1 microclave ext set 14\", 1 each IV Cath 22 G/1\" and 24G/3/4\", 6 alcohol prep pads, 4 nitrile gloves (med). Call [phone] to reorder.  Quantity: [id number] kit  Refills: 0     EPINEPHrine 0.3 MG/0.3ML injection 2-pack  Commonly known as: ANY BX GENERIC EQUIV  Used for: Allergy to gluten      Dose: 0.3 mg  Inject 0.3 mLs (0.3 mg) into the muscle as needed for anaphylaxis. May repeat one time in 5-15 minutes if response to initial dose is inadequate.  Quantity: 2 each  Refills: 0     famotidine 20 MG tablet  Commonly known as: PEPCID  Used for: Abdominal pain, generalized, Irritable bowel syndrome with both constipation and diarrhea, Gastritis with hemorrhage, unspecified chronicity, unspecified gastritis type, Esophagitis determined by biopsy, Constipation, unspecified constipation type      Dose: 20 mg  Take 1 tablet (20 mg) by mouth At Bedtime  Quantity: 30 tablet  Refills: 3     fluocinonide 0.05 % external ointment  Commonly known as: LIDEX  Used for: Irritant contact dermatitis, unspecified trigger      Apply topically 2 times daily Apply as directed  Quantity: 60 g  Refills: 0     * FLUoxetine 10 MG capsule  Commonly known as: PROzac  Used for: Obsessive-compulsive disorder, unspecified type      Dose: 10 mg  Take 1 capsule (10 mg) by mouth daily  Quantity: 90 capsule  Refills: 2     * FLUoxetine 40 MG capsule  Commonly " known as: PROzac  Used for: Obsessive-compulsive disorder, unspecified type      Dose: 40 mg  Take 1 capsule (40 mg) by mouth daily  Quantity: 90 capsule  Refills: 2     * FLUoxetine 20 MG capsule  Commonly known as: PROzac  Used for: Unspecified mood (affective) disorder, Mixed obsessional thoughts and acts      Take 20 mg with 10 mg capsule (for total of 30 mg) for 4 weeks, then taper to 20 mg daily  Quantity: 60 capsule  Refills: 0     gabapentin 300 MG capsule  Commonly known as: NEURONTIN      Dose: 300 mg  Take 300 mg by mouth 2 times daily.  Refills: 0     gentamicin 0.1 % external ointment  Commonly known as: GARAMYCIN  Used for: Irritant contact dermatitis, unspecified trigger, Bacterial infection      Apply topically 2 times daily Mixed with lidex ointment to areas of rash  Quantity: 30 g  Refills: 0     Humira (2 Pen) 40 MG/0.4ML pen kit  Used for: Crohn's disease of both small and large intestine without complication (H)  Generic drug: Adalimumab      Dose: 40 mg  Inject 0.4 mLs (40 mg) subcutaneously every 14 days.  Quantity: 0.8 mL  Refills: 5     hydrOXYzine HCl 10 MG tablet  Commonly known as: ATARAX  Used for: Insomnia, unspecified type      Dose: 20 mg  Take 2 tablets (20 mg) by mouth at bedtime  Quantity: 180 tablet  Refills: 2     mometasone 0.1 % external ointment  Commonly known as: ELOCON  Used for: Dermatitis      Apply twice daily for 2 weeks for sun rash on chest and arms and ears  Quantity: 45 g  Refills: 3     norethindrone-ethinyl estradiol 1.5-30 MG-MCG tablet  Commonly known as: MICROGESTIN 1.5/30  Used for: General counseling and advice on contraceptive management      Dose: 1 tablet  Take 1 tablet by mouth daily . Active tabs only, skip placebo pills. Take continuously.  Quantity: 84 tablet  Refills: 4     ondansetron 4 MG ODT tab  Commonly known as: ZOFRAN ODT  Used for: Crohn's disease of colon without complication (H)      Dose: 4 mg  Take 1 tablet (4 mg) by mouth every 8 hours as  needed for nausea  Quantity: 12 tablet  Refills: 3     pantoprazole 40 MG EC tablet  Commonly known as: PROTONIX  Used for: Abdominal pain, generalized, Irritable bowel syndrome with both constipation and diarrhea, Gastritis with hemorrhage, unspecified chronicity, unspecified gastritis type, Esophagitis determined by biopsy, Constipation, unspecified constipation type      Dose: 40 mg  Take 1 tablet (40 mg) by mouth daily.  Quantity: 90 tablet  Refills: 0     senna 8.6 MG tablet  Commonly known as: Senna Lax  Used for: Abdominal pain, generalized, Irritable bowel syndrome with both constipation and diarrhea, Gastritis with hemorrhage, unspecified chronicity, unspecified gastritis type, Esophagitis determined by biopsy, Constipation, unspecified constipation type      Dose: 1 tablet  Take 1 tablet by mouth daily  Quantity: 30 tablet  Refills: 5     sodium chloride (PF) 0.9% PF flush  Used for: Crohn's disease of colon without complication (H)      Dose: 10 mL  Inject 10 mLs into the vein as needed for line flush. Flush IV before and after medication administration as directed and/or at least every 12 hours.  Quantity: [id number] mL  Refills: 0     sodium chloride 0.9 % in 1,000 mL via gravity infusion  Used for: Crohn's disease of colon without complication (H)      Infuse over 2-4 hours into the vein once a week. Infuse 1 bag(s) (1000 mL). Each bag to infuse over 2-4 hours via gravity  Quantity: [id number] mL  Refills: 0     tacrolimus 0.1 % external ointment  Commonly known as: PROTOPIC      Apply topically.  Refills: 0     Vtama 1 % Crea  Used for: Rash  Generic drug: Tapinarof      Dose: 1 Application  Apply 1 Application topically daily as needed Stop for pregnancy  Quantity: 60 g  Refills: 11         Past medical history, past surgical history, medications, and allergies were reviewed with the patient. Additional pertinent items: None    Family History   Problem Relation Age of Onset    Eye Disorder Mother     Brain  "Cancer Father     Breast Cancer Maternal Grandmother     Hypertension Maternal Grandfather     Hypertension Paternal Grandmother     Crohn's Disease Other     Ulcerative Colitis Other     Colon Polyps Other     Colon Cancer Other        Social History     Tobacco Use    Smoking status: Never     Passive exposure: Never    Smokeless tobacco: Never   Substance Use Topics    Alcohol use: No     Social history was reviewed with the patient. Additional pertinent items: None    Allergies   Allergen Reactions    Gluten Meal Anaphylaxis     Throat gets sores and swelling    Other reaction(s): GI Upset   Sensitivity, avoiding    Sensitivity, avoiding      Other reaction(s): GI Upset Sensitivity, avoiding    Infliximab Anaphylaxis     Pt will still be getting it. Give pre-meds and give over 2 hours      Other Drug Allergy (See Comments) GI Disturbance     Sorbitol-Mannitol-Xanthan Gum - Patient experienced nausea, dizziness, and felt all over body \"tingling\" - discontinued Breeza and continued with water  2/12/2025    Tamiflu [Oseltamivir] Rash       Review of Systems  A medically appropriate review of systems was performed with pertinent positives and negatives noted in the HPI, and all other systems negative.    Physical Exam   BP: (!) 143/82  Pulse: 112  Temp: 97.9  F (36.6  C)  Resp: 16  SpO2: 98 %      Physical Exam  Vitals and nursing note reviewed.   Constitutional:       Appearance: She is not ill-appearing or diaphoretic.   HENT:      Head: Atraumatic.   Eyes:      Extraocular Movements: Extraocular movements intact.      Pupils: Pupils are equal, round, and reactive to light.   Cardiovascular:      Comments: Patient's heart has a regular rhythm but also has some irregular beats that may represent PACs or PVCs  Pulmonary:      Breath sounds: Normal breath sounds.   Abdominal:      Palpations: Abdomen is soft.   Musculoskeletal:         General: No deformity.      Cervical back: Neck supple.   Neurological:      " General: No focal deficit present.      Mental Status: She is alert and oriented to person, place, and time.   Psychiatric:         Mood and Affect: Mood normal.         ED Course     Orders Placed This Encounter   Procedures    Comprehensive metabolic panel    Magnesium    HCG qualitative Blood    CBC with platelets and differential    TSH with free T4 reflex    EKG 12 lead    Cardiac Continuous Monitoring    Peripheral IV catheter    Catheter Site Care    CBC with platelets differential       EKG 1 currently performed in the ER revealed a sinus tachycardia at a rate of 107 with a AZ interval of 0.126 and a QRS duration of 0.076 with no premature beats and no evidence of ischemic changes.  There was a normal axis.  This was read by me personally.     Procedures         Results for orders placed or performed during the hospital encounter of 05/23/25 (from the past 24 hours)   EKG 12 lead   Result Value Ref Range    Systolic Blood Pressure  mmHg    Diastolic Blood Pressure  mmHg    Ventricular Rate 107 BPM    Atrial Rate 107 BPM    AZ Interval 126 ms    QRS Duration 76 ms     ms    QTc 437 ms    P Axis 68 degrees    R AXIS 43 degrees    T Axis 10 degrees    Interpretation ECG       Sinus tachycardia  Nonspecific T wave abnormality  Abnormal ECG     CBC with platelets differential    Narrative    The following orders were created for panel order CBC with platelets differential.  Procedure                               Abnormality         Status                     ---------                               -----------         ------                     CBC with platelets and ...[[id number]]                      Final result                 Please view results for these tests on the individual orders.   Comprehensive metabolic panel   Result Value Ref Range    Sodium 139 135 - 145 mmol/L    Potassium 3.7 3.4 - 5.3 mmol/L    Carbon Dioxide (CO2) 24 22 - 29 mmol/L    Anion Gap 11 7 - 15 mmol/L    Urea Nitrogen 6.1 6.0  - 20.0 mg/dL    Creatinine 0.63 0.51 - 0.95 mg/dL    GFR Estimate >90 >60 mL/min/1.73m2    Calcium 9.8 8.8 - 10.4 mg/dL    Chloride 104 98 - 107 mmol/L    Glucose 104 (H) 70 - 99 mg/dL    Alkaline Phosphatase 53 40 - 150 U/L    AST 23 0 - 35 U/L    ALT 10 0 - 50 U/L    Protein Total 7.8 6.4 - 8.3 g/dL    Albumin 4.4 3.5 - 5.2 g/dL    Bilirubin Total 2.1 (H) <=1.2 mg/dL   Magnesium   Result Value Ref Range    Magnesium 1.8 1.7 - 2.3 mg/dL   HCG qualitative Blood   Result Value Ref Range    hCG Serum Qualitative Negative Negative   CBC with platelets and differential   Result Value Ref Range    WBC Count 7.3 4.0 - 11.0 10e3/uL    RBC Count 5.20 3.80 - 5.20 10e6/uL    Hemoglobin 14.7 11.7 - 15.7 g/dL    Hematocrit 44.6 35.0 - 47.0 %    MCV 86 78 - 100 fL    MCH 28.3 26.5 - 33.0 pg    MCHC 33.0 31.5 - 36.5 g/dL    RDW 12.3 10.0 - 15.0 %    Platelet Count 431 150 - 450 10e3/uL    % Neutrophils 41 %    % Lymphocytes 46 %    % Monocytes 9 %    % Eosinophils 4 %    % Basophils 1 %    % Immature Granulocytes 0 %    NRBCs per 100 WBC 0 <1 /100    Absolute Neutrophils 3.0 1.6 - 8.3 10e3/uL    Absolute Lymphocytes 3.3 0.8 - 5.3 10e3/uL    Absolute Monocytes 0.6 0.0 - 1.3 10e3/uL    Absolute Eosinophils 0.3 0.0 - 0.7 10e3/uL    Absolute Basophils 0.0 0.0 - 0.2 10e3/uL    Absolute Immature Granulocytes 0.0 <=0.4 10e3/uL    Absolute NRBCs 0.0 10e3/uL   TSH with free T4 reflex   Result Value Ref Range    TSH 0.78 0.50 - 4.30 uIU/mL         Medications   sodium chloride (PF) 0.9% PF flush 3 mL (has no administration in time range)   sodium chloride (PF) 0.9% PF flush 3 mL (has no administration in time range)   sodium chloride 0.9 % infusion (has no administration in time range)   sodium chloride 0.9% BOLUS 1,000 mL (0 mLs Intravenous Stopped 5/23/25 1928)         Assessments & Plan (with Medical Decision Making)     I have reviewed the nursing notes.    Patient's labs were unremarkable and EKG only revealed a sinus tachycardia  without any ectopy.  At this time the patient will be sent home and will be mailed a Zio patch to wear and return for analysis.    I have reviewed the findings, diagnosis, plan and need for follow up with the patient.      Final diagnoses:   Palpitations - probable premature beats     Home to rest tonight    Monitor will be mailed to you in the next few days.  Please apply it and wear it for 1 week, then return it for analysis in the same box it was mailed to you in.    Please make an appointment to follow up with Your Primary Care Provider in 2-3 weeks for test results and recheck.    Routine discharge instructions were given for this diagnosis      MARCOS NAVARRO MD    5/23/2025   [facility] EMERGENCY DEPARTMENT       Marcos Navarro MD  05/23/25 2036     show

## 2025-05-23 NOTE — TELEPHONE ENCOUNTER
"Nurse Triage SBAR    Is this a 2nd Level Triage? YES, LICENSED PRACTITIONER REVIEW IS REQUIRED    Situation:  Patient calling in with symptoms of light headedness, heart feeling \"funny\", and watch telling her she's in A-fib on/off    Background:  Patient with PmHx of Crohn's, POTS, anxiety calling in with concerns that watch alerting multiple times she went in and out of a-fib while at rest.     Assessment:    -chief complaint: A-fib   -pt reports lightheadedness   -pt reports during a-fib alerts, heart felt \"funny\"  -reports being completely dizzy laying down   -pt reports drinking adequate fluids today  -current heart rate 107  -baseline heart rate at rest 85-90    Protocol Recommended Disposition:   Go to ED Now (Or PCP Triage)    Recommendation: Patient advised to go to the ER now for further evaluation. Patient alone and declined triager to call 911. Pt did state will call parents to ask for ride. Patient also advised to keep track of a-fib alerts and symptoms and bring along to ER, and to call 911 if symptoms worsen. Pt also told that care team would be notified to follow up as well. Pt agreed with recommendations with modifications and will go to ER when parents able to give a ride.     Routed to provider    KLARISSA Hi  [facility] Nursing/Red Flag Triage & Med Refill Team       Reason for Disposition   Extra heartbeats, irregular heart beating, or heart is beating very fast  (i.e., \"palpitations\")    Additional Information   Negative: SEVERE difficulty breathing (e.g., struggling for each breath, speaks in single words)   Negative: [1] Difficulty breathing or swallowing AND [2] started suddenly after medicine, an allergic food or bee sting   Negative: Shock suspected (e.g., cold/pale/clammy skin, too weak to stand, low BP, rapid pulse)   Negative: Difficult to awaken or acting confused (e.g., disoriented, slurred speech)   Negative: [1] Weakness (i.e., paralysis, loss of muscle strength) of the face, arm or " "leg on one side of the body AND [2] sudden onset AND [3] present now   Negative: [1] Numbness (i.e., loss of sensation) of the face, arm or leg on one side of the body AND [2] sudden onset AND [3] present now   Negative: [1] Loss of speech or garbled speech AND [2] sudden onset AND [3] present now   Negative: Overdose (accidental or intentional) of medications   Negative: [1] Fainted > 15 minutes ago AND [2] still feels too weak or dizzy to stand   Negative: Heart beating < 50 beats per minute OR > 140 beats per minute   Negative: Sounds like a life-threatening emergency to the triager   Negative: Chest pain   Negative: Rectal bleeding, bloody stool, or tarry-black stool   Negative: [1] Vomiting AND [2] contains red blood or black (\"coffee ground\") material   Negative: Vomiting is main symptom   Negative: Diarrhea is main symptom   Negative: Headache is main symptom   Negative: Patient states that they are having an anxiety or panic attack   Negative: Dizziness from low blood sugar (i.e., < 60 mg/dl or 3.5 mmol/l)   Negative: Dizziness is described as a spinning sensation (i.e., vertigo)   Negative: Heat exhaustion suspected (i.e., dehydration from heat exposure)   Negative: Difficulty breathing   Negative: SEVERE dizziness (e.g., unable to stand, requires support to walk, feels like passing out now)    Answer Assessment - Initial Assessment Questions  1. DESCRIPTION: Per patient report, \"hard to describe, but I'm completely dizzy laying down\"   2. LIGHTHEADED: Pt does report lightheadedness - has not attempted to walk   3. VERTIGO: Yes, dizzy while laying down   4. SEVERITY: Per patient report, \"I have POTS so it's hard to say\"  5. ONSET: 1:30-2pm  6. AGGRAVATING FACTORS: No  7. HEART RATE: 107  8. CAUSE: Unknown  9. RECURRENT SYMPTOM: Yes, has POTS   10. OTHER SYMPTOMS: \"heart feels funny\"  11. PREGNANCY: No    Protocols used: Dizziness - [id number]    "

## 2025-05-23 NOTE — PROGRESS NOTES
Nursing Visit Note:  Nurse visit today for PIV start for Jaimie Ortiz.     present during visit today: Not Applicable.    Intravenous Access:  Peripheral IV placed.      Note: patient alert and oriented. NA. reported Palpitations from last week resolved spontaneously. patient states woke up during the night feeling  heart pounding  and somewhat short of breath. Also reported some chest pain at the time. Patient states went back to sleep and, when she woke up, had resolved. patient states still had some intermittent palpitations throughout the week, but improved since last week s bag of fluids and has not really had any issues since (since last Tuesday ). VSS today. Heart rate regular. Started gabapentin recently and says it has been helpful. No other changes. IV fluid infusing by end of visit via new ultrasound guided PIV. does not want to do subcutaneous fluids. continue POC. Encouraged to call with questions/concern/updates.    Saline administered: 10 (ml)    Supply Check:   Does the patient have all the supplies they need for the next visit?  Yes    Next visit plan: 5/28/25.    Neymar Bledsoe RN 5/22/2025

## 2025-05-23 NOTE — ED TRIAGE NOTES
Patient presents to ED with CC of dizziness after waking up from nap around 2 pm. Patient states her apple watch told her she was in a fib. Pulse in triage from   Intermittent chest pain. Denies SOB     Hx of crohns disease

## 2025-05-24 ENCOUNTER — ORDERS ONLY (AUTO-RELEASED) (OUTPATIENT)
Dept: EMERGENCY MEDICINE | Facility: CLINIC | Age: 20
End: 2025-05-24
Payer: COMMERCIAL

## 2025-05-24 DIAGNOSIS — R00.2 PALPITATIONS: ICD-10-CM

## 2025-05-24 NOTE — DISCHARGE INSTRUCTIONS
Home to rest tonight    A Heart Monitor will be mailed to you in the next few days.  Please apply it and wear it for 1 week, then return it for analysis in the same box it was mailed to you in.    Please make an appointment to follow up with Your Primary Care Provider in 2-3 weeks for test results and recheck.

## 2025-05-26 LAB
ATRIAL RATE - MUSE: 107 BPM
DIASTOLIC BLOOD PRESSURE - MUSE: NORMAL MMHG
INTERPRETATION ECG - MUSE: NORMAL
P AXIS - MUSE: 68 DEGREES
PR INTERVAL - MUSE: 126 MS
QRS DURATION - MUSE: 76 MS
QT - MUSE: 328 MS
QTC - MUSE: 437 MS
R AXIS - MUSE: 43 DEGREES
SYSTOLIC BLOOD PRESSURE - MUSE: NORMAL MMHG
T AXIS - MUSE: 10 DEGREES
VENTRICULAR RATE- MUSE: 107 BPM

## 2025-05-27 NOTE — TELEPHONE ENCOUNTER
Noted  Agree with triage- patient to report to ED with current sx  Honorio Bales RN on 5/27/2025 at 8:32 AM

## 2025-05-28 ENCOUNTER — HOME INFUSION (OUTPATIENT)
Dept: HOME HEALTH SERVICES | Facility: HOME HEALTH | Age: 20
End: 2025-05-28

## 2025-05-28 ENCOUNTER — HOME CARE VISIT (OUTPATIENT)
Dept: HOME HEALTH SERVICES | Facility: HOME HEALTH | Age: 20
End: 2025-05-28
Payer: COMMERCIAL

## 2025-05-28 VITALS
SYSTOLIC BLOOD PRESSURE: 126 MMHG | HEART RATE: 85 BPM | TEMPERATURE: 97.5 F | RESPIRATION RATE: 16 BRPM | DIASTOLIC BLOOD PRESSURE: 62 MMHG | OXYGEN SATURATION: 99 %

## 2025-05-28 PROCEDURE — 99601 HOME NFS VISIT <2 HRS: CPT

## 2025-05-28 PROCEDURE — S9374 HIT HYDRA 1 LITER DIEM: HCPCS

## 2025-05-28 NOTE — PROGRESS NOTES
Nursing Visit Note:  Nurse visit today for PIV start for Jaimie Ortiz.     present during visit today: Not Applicable.    Intravenous Access:  Peripheral IV placed.      Note: patient alert and oriented. NAD. Patient states continues to have intermittent palpitations/irregular heart rate. Patient states watch has been alerting patient about this fairly regularly but does not always have symptoms associated with irregularities. Patient no longer wearing watch. Continues to wait on delivery of heart monitor. VSS. New PIV placed with ultrasound guidance. Fluids primed, and ready for infusion. Patient plans to start in a couple hours. Encouraged to call with questions/concern/updates.    Saline administered: 10 (ml)    Supply Check:   Does the patient have all the supplies they need for the next visit?  No, Order placed for alcohol swabs and IV tubing. patient will request additional supplies as needed. Patient states delivery planned for next week with regular supplies. should be available by next visit.    Next visit plan: 6/4/25 for new PIV start    Neymar Bledsoe RN 5/28/2025

## 2025-05-29 ENCOUNTER — HOME INFUSION BILLING (OUTPATIENT)
Dept: HOME HEALTH SERVICES | Facility: HOME HEALTH | Age: 20
End: 2025-05-29
Payer: COMMERCIAL

## 2025-06-02 ENCOUNTER — HOME CARE VISIT (OUTPATIENT)
Dept: HOME HEALTH SERVICES | Facility: HOME HEALTH | Age: 20
End: 2025-06-02
Payer: COMMERCIAL

## 2025-06-02 ENCOUNTER — HOME INFUSION BILLING (OUTPATIENT)
Dept: HOME HEALTH SERVICES | Facility: HOME HEALTH | Age: 20
End: 2025-06-02
Payer: COMMERCIAL

## 2025-06-02 VITALS
DIASTOLIC BLOOD PRESSURE: 70 MMHG | TEMPERATURE: 98.6 F | OXYGEN SATURATION: 99 % | HEART RATE: 109 BPM | SYSTOLIC BLOOD PRESSURE: 120 MMHG | RESPIRATION RATE: 16 BRPM

## 2025-06-02 PROCEDURE — S1015 IV TUBING EXTENSION SET: HCPCS

## 2025-06-02 PROCEDURE — 99601 HOME NFS VISIT <2 HRS: CPT

## 2025-06-02 NOTE — PROGRESS NOTES
Infusion therapy plan discontinued as pt is no longer seeing provider she has switched over to the [facility]. Writer updated [facility] as well.    Gisela Rojas RN

## 2025-06-02 NOTE — PROGRESS NOTES
Nursing Visit Note:  Nurse visit today for PIV start for Jaimie Ortiz.     present during visit today: Not Applicable.    Intravenous Access:  Peripheral IV placed.      Note: patient alert and oriented. Reports recent Crohn s flare last night. Patient states she spent a couple hours on the toilet. Diarrhea and abdominal pain are now gone. Patient, however, continues to complain of persistent, nausea, poor appetite and dizziness.No emesis. Patient controlling with Zofran. Also continues to complain of chest tightness that  wraps around.  Patient rating pain at 2/10. Patient states pain is about the same as it has been. Continues to have frequent episodes of palpitations. Zio patch heart monitor in place. patient states she has a couple days left of this. patient will go to ER if any worsening symptoms. New ultrasound guided PIV started. Infusing by end of visit.    Saline administered: 10 (ml)    Supply Check:   Does the patient have all the supplies they need for the next visit?  Yes    Next visit plan: 6/11/25 or TBD next week on patient request.    Neymar Bledsoe RN 6/2/2025

## 2025-06-04 PROCEDURE — S9374 HIT HYDRA 1 LITER DIEM: HCPCS

## 2025-06-05 ENCOUNTER — OFFICE VISIT (OUTPATIENT)
Dept: FAMILY MEDICINE | Facility: CLINIC | Age: 20
End: 2025-06-05
Payer: COMMERCIAL

## 2025-06-05 ENCOUNTER — RESULTS FOLLOW-UP (OUTPATIENT)
Dept: FAMILY MEDICINE | Facility: CLINIC | Age: 20
End: 2025-06-05

## 2025-06-05 ENCOUNTER — LAB (OUTPATIENT)
Dept: LAB | Facility: CLINIC | Age: 20
End: 2025-06-05
Payer: COMMERCIAL

## 2025-06-05 VITALS
HEIGHT: 62 IN | DIASTOLIC BLOOD PRESSURE: 85 MMHG | RESPIRATION RATE: 16 BRPM | HEART RATE: 91 BPM | OXYGEN SATURATION: 98 % | WEIGHT: 115.2 LBS | BODY MASS INDEX: 21.2 KG/M2 | TEMPERATURE: 97.7 F | SYSTOLIC BLOOD PRESSURE: 125 MMHG

## 2025-06-05 DIAGNOSIS — R11.0 NAUSEA: ICD-10-CM

## 2025-06-05 DIAGNOSIS — R10.13 EPIGASTRIC PAIN: Primary | ICD-10-CM

## 2025-06-05 DIAGNOSIS — R19.4 CHANGE IN BOWEL HABIT: ICD-10-CM

## 2025-06-05 DIAGNOSIS — R10.13 EPIGASTRIC PAIN: ICD-10-CM

## 2025-06-05 LAB
ALBUMIN SERPL BCG-MCNC: 4.3 G/DL (ref 3.5–5.2)
ALBUMIN UR-MCNC: NEGATIVE MG/DL
ALP SERPL-CCNC: 38 U/L (ref 40–150)
ALT SERPL W P-5'-P-CCNC: 6 U/L (ref 0–50)
ANION GAP SERPL CALCULATED.3IONS-SCNC: 11 MMOL/L (ref 7–15)
APPEARANCE UR: CLEAR
AST SERPL W P-5'-P-CCNC: 17 U/L (ref 0–35)
BACTERIA #/AREA URNS HPF: ABNORMAL /HPF
BASOPHILS # BLD AUTO: 0.1 10E3/UL (ref 0–0.2)
BASOPHILS NFR BLD AUTO: 1 %
BILIRUB SERPL-MCNC: 2.6 MG/DL
BILIRUB UR QL STRIP: NEGATIVE
BUN SERPL-MCNC: 6 MG/DL (ref 6–20)
CALCIUM SERPL-MCNC: 9.3 MG/DL (ref 8.8–10.4)
CHLORIDE SERPL-SCNC: 106 MMOL/L (ref 98–107)
COLOR UR AUTO: YELLOW
CREAT SERPL-MCNC: 0.65 MG/DL (ref 0.51–0.95)
CRP SERPL-MCNC: <3 MG/L
EGFRCR SERPLBLD CKD-EPI 2021: >90 ML/MIN/1.73M2
EOSINOPHIL # BLD AUTO: 0.1 10E3/UL (ref 0–0.7)
EOSINOPHIL NFR BLD AUTO: 1 %
ERYTHROCYTE [DISTWIDTH] IN BLOOD BY AUTOMATED COUNT: 12.8 % (ref 10–15)
ERYTHROCYTE [SEDIMENTATION RATE] IN BLOOD BY WESTERGREN METHOD: 7 MM/HR (ref 0–20)
GLUCOSE SERPL-MCNC: 81 MG/DL (ref 70–99)
GLUCOSE UR STRIP-MCNC: NEGATIVE MG/DL
HCO3 SERPL-SCNC: 21 MMOL/L (ref 22–29)
HCT VFR BLD AUTO: 35.8 % (ref 35–47)
HGB BLD-MCNC: 11.9 G/DL (ref 11.7–15.7)
HGB UR QL STRIP: NEGATIVE
IMM GRANULOCYTES # BLD: 0 10E3/UL
IMM GRANULOCYTES NFR BLD: 0 %
KETONES UR STRIP-MCNC: 40 MG/DL
LEUKOCYTE ESTERASE UR QL STRIP: ABNORMAL
LIPASE SERPL-CCNC: 27 U/L (ref 13–60)
LYMPHOCYTES # BLD AUTO: 4.1 10E3/UL (ref 0.8–5.3)
LYMPHOCYTES NFR BLD AUTO: 45 %
MCH RBC QN AUTO: 28.3 PG (ref 26.5–33)
MCHC RBC AUTO-ENTMCNC: 33.2 G/DL (ref 31.5–36.5)
MCV RBC AUTO: 85 FL (ref 78–100)
MONOCYTES # BLD AUTO: 0.8 10E3/UL (ref 0–1.3)
MONOCYTES NFR BLD AUTO: 9 %
MUCOUS THREADS #/AREA URNS LPF: PRESENT /LPF
NEUTROPHILS # BLD AUTO: 4.1 10E3/UL (ref 1.6–8.3)
NEUTROPHILS NFR BLD AUTO: 44 %
NITRATE UR QL: NEGATIVE
NRBC # BLD AUTO: 0 10E3/UL
NRBC BLD AUTO-RTO: 0 /100
PH UR STRIP: 6.5 [PH] (ref 5–7)
PLATELET # BLD AUTO: 384 10E3/UL (ref 150–450)
POTASSIUM SERPL-SCNC: 4 MMOL/L (ref 3.4–5.3)
PROT SERPL-MCNC: 7.1 G/DL (ref 6.4–8.3)
RBC # BLD AUTO: 4.21 10E6/UL (ref 3.8–5.2)
RBC URINE: 3 /HPF
SODIUM SERPL-SCNC: 138 MMOL/L (ref 135–145)
SP GR UR STRIP: 1.03 (ref 1–1.03)
SQUAMOUS EPITHELIAL: <1 /HPF
UROBILINOGEN UR STRIP-MCNC: NORMAL MG/DL
WBC # BLD AUTO: 9.2 10E3/UL (ref 4–11)
WBC URINE: 5 /HPF

## 2025-06-05 RX ORDER — ONDANSETRON 8 MG/1
8 TABLET, ORALLY DISINTEGRATING ORAL EVERY 8 HOURS PRN
Qty: 15 TABLET | Refills: 1 | Status: SHIPPED | OUTPATIENT
Start: 2025-06-05

## 2025-06-05 ASSESSMENT — ENCOUNTER SYMPTOMS: NAUSEA: 1

## 2025-06-05 NOTE — PROGRESS NOTES
Assessment & Plan     Epigastric pain    - CBC with platelets and differential; Future  - Comprehensive metabolic panel (BMP + Alb, Alk Phos, ALT, AST, Total. Bili, TP); Future  - Lipase; Future  - Helicobacter pylori Antigen Stool; Future  - UA Macroscopic with reflex to Microscopic and Culture - Lab Collect; Future    Change in bowel habit    - Calprotectin Feces; Future  - CRP, inflammation; Future  - ESR: Erythrocyte sedimentation rate; Future  - C. difficile Toxin B PCR with reflex to C. difficile EIA; Future  - Enteric Bacteria and Virus Panel by [facility] Stool; Future    Nausea  Increase from 4  - ondansetron (ZOFRAN ODT) 8 MG ODT tab; Take 1 tablet (8 mg) by mouth every 8 hours as needed for nausea.    Cont current meds o/w    The longitudinal plan of care for the diagnosis(es)/condition(s) as documented were addressed during this visit. Due to the added complexity in care, I will continue to support Jaimie in the subsequent management and with ongoing continuity of care.Review of external notes as documented elsewhere in note  32 minutes spent by me on the date of the encounter doing chart review, history and exam, documentation and further activities per the note        Subjective   Jaimie is a 19 year old, presenting for the following health issues:  Nausea (Nausea for past couple days)      6/5/2025     4:12 PM   Additional Questions   Roomed by KTR   Accompanied by MARIE(MOM)     Nausea  Associated symptoms include nausea.   History of Present Illness       Reason for visit:  Nausea  Symptom onset:  1-3 days ago  Symptom intensity:  Moderate  Symptom progression:  Staying the same  Had these symptoms before:  No   She is taking medications regularly.      H/o IBD, seen at [place]  Recent several days nausea worst in am, mild increase bowel loose/frequency unknown if red/black stool  Recent UPT neg pt denies sexual activity and sure not pregnant  No fever  No expsoures or travel  Is immune suppressed    Also  assoc slightly wose upper abd pain   Past Medical History:   Diagnosis Date    Allergy to mold spores     12/9/13 skin tests pos. only for M/W only    Anxiety     Crohn's disease (H)     Diagnostic skin and sensitization tests 12/9/13 skin tests pos. only for M/W only    HSP ([facility] purpura) 12/2007    resolved    Other and unspecified noninfectious gastroenteritis and colitis(558.9) 05/20/2009    Seasonal allergic rhinitis      Past Surgical History:   Procedure Laterality Date    CAPSULE/PILL CAM ENDOSCOPY N/A 02/03/2021    Procedure: VIDEO CAPSULE ENDOSCOPY;  Surgeon: Marily Lane MD;  Location: [facility]     COLONOSCOPY N/A 12/16/2020    Procedure: COLONOSCOPY, WITH POLYPECTOMY AND BIOPSY;  Surgeon: Marily Lane MD;  Location: [facility]     COLONOSCOPY N/A 07/13/2021    Procedure: COLONOSCOPY, WITH POLYPECTOMY AND BIOPSY;  Surgeon: Marily Lane MD;  Location: [facility]     COLONOSCOPY N/A 08/01/2022    Procedure: COLONOSCOPY, WITH POLYPECTOMY AND BIOPSY;  Surgeon: Marily Lane MD;  Location: [facility]     ESOPHAGOSCOPY, GASTROSCOPY, DUODENOSCOPY (EGD), COMBINED N/A 12/16/2020    Procedure: upper endoscopy, WITH BIOPSY;  Surgeon: Marily Lane MD;  Location: [facility]     ESOPHAGOSCOPY, GASTROSCOPY, DUODENOSCOPY (EGD), COMBINED N/A 07/13/2021    Procedure: ESOPHAGOGASTRODUODENOSCOPY, WITH BIOPSY;  Surgeon: Marily Lane MD;  Location: [facility]     ESOPHAGOSCOPY, GASTROSCOPY, DUODENOSCOPY (EGD), COMBINED N/A 08/01/2022    Procedure: ESOPHAGOGASTRODUODENOSCOPY, WITH BIOPSY;  Surgeon: Marily Lane MD;  Location: [facility]     ORTHOPEDIC SURGERY  2010    Trigger thumb    AR HAND/FINGER SURGERY UNLISTED  2013    Trigger thumb    TONSILLECTOMY, ADENOIDECTOMY, COMBINED Bilateral 12/19/2019    Procedure: Bilateral TONSILLECTOMY, possible adenoidectomy;  Surgeon: Markus Rojas MD;  Location: [facility]     Current Outpatient Medications   Medication Sig Dispense  "Refill    acetaminophen (TYLENOL) 325 MG tablet Take 2 tablets (650 mg) 30 minutes before injecting Humira. 12 tablet 1    BANOPHEN 25 MG capsule TAKE 1 CAPSULE BY MOUTH 30 MINUTES BEFORE INJECTING HUMIRA      COMPOUNDED NON-CONTROLLED SUBSTANCE (CMPD RX) - PHARMACY TO MIX COMPOUNDED MEDICATION Apply 20 mLs into each nare 2 times daily Gentamicin/Dexamethasone 160/120mg/liter saline 1000 mL 12    cyanocobalamin (CYANOCOBALAMIN) 1000 mcg/mL injection Inject 1,000 mcg into the muscle every 30 days.      dicyclomine (BENTYL) 10 MG capsule Take 1 capsule (10 mg) by mouth 4 times daily as needed (abdominal pain). Slowly wean off as directed. 120 capsule 3    diphenhydrAMINE (BENADRYL) 25 MG tablet Take 1 tablet (25 mg) 30 minutes before injecting Humira 25 tablet 1    Emergency Supply Kit, PIV, Patient use for emergency only. Contents: 3 sodium chloride 0.9% flushes, 1 IV start kit, 1 microclave ext set 14\", 1 each IV Cath 22 G/1\" and 24G/3/4\", 6 alcohol prep pads, 4 nitrile gloves (med). Call [phone] to reorder. 782013 kit 0    EPINEPHrine (ANY BX GENERIC EQUIV) 0.3 MG/0.3ML injection 2-pack Inject 0.3 mLs (0.3 mg) into the muscle as needed for anaphylaxis. May repeat one time in 5-15 minutes if response to initial dose is inadequate. 2 each 0    famotidine (PEPCID) 20 MG tablet Take 1 tablet (20 mg) by mouth At Bedtime 30 tablet 3    fluocinonide (LIDEX) 0.05 % external ointment Apply topically 2 times daily Apply as directed 60 g 0    FLUoxetine (PROZAC) 10 MG capsule Take 1 capsule (10 mg) by mouth daily 90 capsule 2    FLUoxetine (PROZAC) 20 MG capsule Take 20 mg with 10 mg capsule (for total of 30 mg) for 4 weeks, then taper to 20 mg daily 60 capsule 0    gabapentin (NEURONTIN) 300 MG capsule Take 300 mg by mouth 2 times daily.      gentamicin (GARAMYCIN) 0.1 % external ointment Apply topically 2 times daily Mixed with lidex ointment to areas of rash 30 g 0    HUMIRA, 2 PEN, 40 MG/0.4ML pen kit Inject 0.4 " mLs (40 mg) subcutaneously every 14 days. 0.8 mL 5    hydrOXYzine HCl (ATARAX) 10 MG tablet Take 2 tablets (20 mg) by mouth at bedtime 180 tablet 2    mometasone (ELOCON) 0.1 % external ointment Apply twice daily for 2 weeks for sun rash on chest and arms and ears 45 g 3    norethindrone-ethinyl estradiol (MICROGESTIN 1.5/30) 1.5-30 MG-MCG tablet Take 1 tablet by mouth daily . Active tabs only, skip placebo pills. Take continuously. 84 tablet 4    ondansetron (ZOFRAN ODT) 4 MG ODT tab Take 1 tablet (4 mg) by mouth every 8 hours as needed for nausea 12 tablet 3    ondansetron (ZOFRAN ODT) 8 MG ODT tab Take 1 tablet (8 mg) by mouth every 8 hours as needed for nausea. 15 tablet 1    pantoprazole (PROTONIX) 40 MG EC tablet Take 1 tablet (40 mg) by mouth daily. 90 tablet 0    senna (SENNA LAX) 8.6 MG tablet Take 1 tablet by mouth daily 30 tablet 5    sodium chloride 0.9 % in 1,000 mL via gravity infusion Infuse over 2-4 hours into the vein once a week. Infuse 1 bag(s) (1000 mL). Each bag to infuse over 2-4 hours via gravity 221696 mL 0    sodium chloride, PF, 0.9% PF flush Inject 10 mLs into the vein as needed for line flush. Flush IV before and after medication administration as directed and/or at least every 12 hours. [id number] mL 0    tacrolimus (PROTOPIC) 0.1 % external ointment Apply topically.      VTAMA 1 % CREA Apply 1 Application topically daily as needed Stop for pregnancy 60 g 11    FLUoxetine (PROZAC) 40 MG capsule Take 1 capsule (40 mg) by mouth daily (Patient not taking: Reported on 6/5/2025) 90 capsule 2     No current facility-administered medications for this visit.     Allergies   Allergen Reactions    Gluten Meal Anaphylaxis     Throat gets sores and swelling    Other reaction(s): GI Upset   Sensitivity, avoiding    Sensitivity, avoiding      Other reaction(s): GI Upset Sensitivity, avoiding    Infliximab Anaphylaxis     Pt will still be getting it. Give pre-meds and give over 2 hours      Other Drug  "Allergy (See Comments) GI Disturbance     Sorbitol-Mannitol-Xanthan Gum - Patient experienced nausea, dizziness, and felt all over body \"tingling\" - discontinued Breeza and continued with water  2/12/2025    Tamiflu [Oseltamivir] Rash     Family History   Problem Relation Age of Onset    Eye Disorder Mother     Brain Cancer Father     Other Cancer Father         Brain    Breast Cancer Maternal Grandmother     Hyperlipidemia Maternal Grandmother     Hypertension Maternal Grandfather     Hypertension Paternal Grandmother     Crohn's Disease Other     Ulcerative Colitis Other     Colon Polyps Other     Colon Cancer Other      Social History     Socioeconomic History    Marital status: Single     Spouse name: Not on file    Number of children: Not on file    Years of education: Not on file    Highest education level: Not on file   Occupational History    Not on file   Tobacco Use    Smoking status: Never     Passive exposure: Never    Smokeless tobacco: Never   Vaping Use    Vaping status: Never Used   Substance and Sexual Activity    Alcohol use: No    Drug use: No    Sexual activity: Never   Other Topics Concern    Not on file   Social History Narrative    Not on file     Social Drivers of Health     Financial Resource Strain: Low Risk  (2/11/2025)    Financial Resource Strain     Within the past 12 months, have you or your family members you live with been unable to get utilities (heat, electricity) when it was really needed?: No   Food Insecurity: Low Risk  (2/11/2025)    Food Insecurity     Within the past 12 months, did you worry that your food would run out before you got money to buy more?: No     Within the past 12 months, did the food you bought just not last and you didn t have money to get more?: No   Transportation Needs: Low Risk  (2/11/2025)    Transportation Needs     Within the past 12 months, has lack of transportation kept you from medical appointments, getting your medicines, non-medical meetings or " "appointments, work, or from getting things that you need?: No   Physical Activity: Insufficiently Active (5/28/2024)    Received from [facility]    Exercise Vital Sign     Days of Exercise per Week: 5 days     Minutes of Exercise per Session: 20 min   Stress: Not on file   Social Connections: Not on file   Interpersonal Safety: Low Risk  (6/5/2025)    Interpersonal Safety     Do you feel physically and emotionally safe where you currently live?: Yes     Within the past 12 months, have you been hit, slapped, kicked or otherwise physically hurt by someone?: No     Within the past 12 months, have you been humiliated or emotionally abused in other ways by your partner or ex-partner?: No   Housing Stability: Low Risk  (2/11/2025)    Housing Stability     Do you have housing? : Yes     Are you worried about losing your housing?: No         Objective    /85 (BP Location: Right arm, Patient Position: Sitting, Cuff Size: Adult Regular)   Pulse 91   Temp 97.7  F (36.5  C) (Temporal)   Resp 16   Ht 1.575 m (5' 2\")   Wt 52.3 kg (115 lb 3.2 oz)   LMP  (LMP Unknown)   SpO2 98%   BMI 21.07 kg/m    Body mass index is 21.07 kg/m .  Physical Exam   GENERAL: alert and no distress  ABDOMEN: soft, mild epig tender no rebound guard, no hepatosplenomegaly, no masses and bowel sounds normal  MS: no gross musculoskeletal defects noted, no edema            Signed Electronically by: Chema Young MD    "

## 2025-06-06 PROCEDURE — 99000 SPECIMEN HANDLING OFFICE-LAB: CPT | Performed by: PATHOLOGY

## 2025-06-06 PROCEDURE — 87338 HPYLORI STOOL AG IA: CPT | Performed by: FAMILY MEDICINE

## 2025-06-06 PROCEDURE — 83993 ASSAY FOR CALPROTECTIN FECAL: CPT | Performed by: FAMILY MEDICINE

## 2025-06-06 PROCEDURE — 87507 IADNA-DNA/RNA PROBE TQ 12-25: CPT | Performed by: FAMILY MEDICINE

## 2025-06-09 LAB
CALPROTECTIN STL-MCNT: 130 MG/KG (ref 0–49.9)
H PYLORI AG STL QL IA: NEGATIVE

## 2025-06-09 NOTE — TELEPHONE ENCOUNTER
Called lab. Unable to add the C Diff onto the Friday AM stool specimen.  C Diff need to be processed within 72 hours of collection.    Torin Leslie CMA ([facility]) at 3:28 PM on 6/9/2025

## 2025-06-09 NOTE — TELEPHONE ENCOUNTER
I met her once late last week; GI symptoms, now w/ fever  Does have Crohns, also, on immune suppressants  One lab I ordered (c diff) not run for some reasons; other stool tests were, can you ask lab if they can run it off stool they got  Given fevers now can offer new appt; I do not know her well  Are there any open spots today yet or tmrw eg residents?

## 2025-06-10 ENCOUNTER — TELEPHONE (OUTPATIENT)
Dept: INTERNAL MEDICINE | Facility: CLINIC | Age: 20
End: 2025-06-10

## 2025-06-10 NOTE — TELEPHONE ENCOUNTER
Patient confirmed rescheduled appointment:  Date: 6/11  Time: 4:30pm  Visit type: ACC  Provider: Dr. Hernandes

## 2025-06-10 NOTE — PROGRESS NOTES
OBI;     Last seen by Dr. Young, 6/5/2025 for epigastric pain, change in bowel habits, and nausea. She has a h/o Crohn's and is followed at the [facility] and is on Humaria. She does not have  a port. She states her nausea is less taking Zofran. She states about a week of bloody stools and abdominal complaints. No loose stools. She states last few days with mild fever, no greater than 100 degrees and she is taking acetaminophen. She has not been on prednisone for her Crohn's disease since her original diagnosis. However,  she did have bloody stools in 12/2024 and was incidentally treated with steroids for pharyngitis and her bloody stools resolved. She had unremarkable CT abdominal/pelvic CT scan at the [facility] 2/12/2025. She had a Flex Sig at the [facility] 2/13/2025 that was essentially normal. She denies any sinus infectious sxs. No sore throat, no neck adenopathy, no cough, no SOB no CP. No urinary complaints. She states she is not sexually active and no vaginal drainage nor sxs. She denies any open infectious skin lesions. No recent travel, no recent sick contacts. No new medications. She states she had a negative strep testing recently (can't find this information). She does not feel she is dehydrated. She is eating.       Past Medical History:   Diagnosis Date    Allergy to mold spores     12/9/13 skin tests pos. only for M/W only    Anxiety     Crohn's disease (H)     Diagnostic skin and sensitization tests 12/9/13 skin tests pos. only for M/W only    HSP (Henoch Schonlein purpura) 12/2007    resolved    Other and unspecified noninfectious gastroenteritis and colitis(558.9) 05/20/2009    Seasonal allergic rhinitis      Past Surgical History:   Procedure Laterality Date    CAPSULE/PILL CAM ENDOSCOPY N/A 02/03/2021    Procedure: VIDEO CAPSULE ENDOSCOPY;  Surgeon: Marily Lane MD;  Location:  [facility] SED[facility]     COLONOSCOPY N/A 12/16/2020    Procedure: COLONOSCOPY, WITH POLYPECTOMY AND BIOPSY;   Surgeon: Marily Lane MD;  Location: [facility]     COLONOSCOPY N/A 07/13/2021    Procedure: COLONOSCOPY, WITH POLYPECTOMY AND BIOPSY;  Surgeon: Marily Lane MD;  Location: [facility]     COLONOSCOPY N/A 08/01/2022    Procedure: COLONOSCOPY, WITH POLYPECTOMY AND BIOPSY;  Surgeon: Marily Lane MD;  Location: [facility]     ESOPHAGOSCOPY, GASTROSCOPY, DUODENOSCOPY (EGD), COMBINED N/A 12/16/2020    Procedure: upper endoscopy, WITH BIOPSY;  Surgeon: Marily Lane MD;  Location: [facility]     ESOPHAGOSCOPY, GASTROSCOPY, DUODENOSCOPY (EGD), COMBINED N/A 07/13/2021    Procedure: ESOPHAGOGASTRODUODENOSCOPY, WITH BIOPSY;  Surgeon: Marily Lane MD;  Location: [facility]     ESOPHAGOSCOPY, GASTROSCOPY, DUODENOSCOPY (EGD), COMBINED N/A 08/01/2022    Procedure: ESOPHAGOGASTRODUODENOSCOPY, WITH BIOPSY;  Surgeon: Marily Lane MD;  Location: [facility]     ORTHOPEDIC SURGERY  2010    Trigger thumb    VT HAND/FINGER SURGERY UNLISTED  2013    Trigger thumb    TONSILLECTOMY, ADENOIDECTOMY, COMBINED Bilateral 12/19/2019    Procedure: Bilateral TONSILLECTOMY, possible adenoidectomy;  Surgeon: Markus Rojas MD;  Location: [facility] OR     PE:    Vitals noted, gen, nad, cooperative, alert, ears clear no erythema, oropharynx clear, no erythema, no exudate, no cervical or submandibular adenopathy. Lung with good air movement and clear, RRR, S1, S2, no MRG, abdomen, with positive bowel sounds, slight distension, no rebound, no masses, no tenderness. No B CVA tenderness to palpation. No suprapubic tenderness to palpation. No open skin lesions. Grossly normal neurological exam     Recent Results (from the past 720 hours)   EKG 12 lead    Collection Time: 05/23/25  6:00 PM   Result Value Ref Range    Systolic Blood Pressure  mmHg    Diastolic Blood Pressure  mmHg    Ventricular Rate 107 BPM    Atrial Rate 107 BPM    VT Interval 126 ms    QRS Duration 76 ms     ms    QTc 437 ms    P Axis 68 degrees    R  AXIS 43 degrees    T Axis 10 degrees    Interpretation ECG       Sinus tachycardia  Nonspecific T wave abnormality  Abnormal ECG    Unconfirmed report - interpretation of this ECG is computer generated - see medical record for final interpretation  Confirmed by - EMERGENCY ROOM, PHYSICIAN ([id number]),  OLVIN LAROSE ([id number]) on 5/26/2025 8:42:03 AM     Comprehensive metabolic panel    Collection Time: 05/23/25  6:14 PM   Result Value Ref Range    Sodium 139 135 - 145 mmol/L    Potassium 3.7 3.4 - 5.3 mmol/L    Carbon Dioxide (CO2) 24 22 - 29 mmol/L    Anion Gap 11 7 - 15 mmol/L    Urea Nitrogen 6.1 6.0 - 20.0 mg/dL    Creatinine 0.63 0.51 - 0.95 mg/dL    GFR Estimate >90 >60 mL/min/1.73m2    Calcium 9.8 8.8 - 10.4 mg/dL    Chloride 104 98 - 107 mmol/L    Glucose 104 (H) 70 - 99 mg/dL    Alkaline Phosphatase 53 40 - 150 U/L    AST 23 0 - 35 U/L    ALT 10 0 - 50 U/L    Protein Total 7.8 6.4 - 8.3 g/dL    Albumin 4.4 3.5 - 5.2 g/dL    Bilirubin Total 2.1 (H) <=1.2 mg/dL   Magnesium    Collection Time: 05/23/25  6:14 PM   Result Value Ref Range    Magnesium 1.8 1.7 - 2.3 mg/dL   HCG qualitative Blood    Collection Time: 05/23/25  6:14 PM   Result Value Ref Range    hCG Serum Qualitative Negative Negative   CBC with platelets and differential    Collection Time: 05/23/25  6:14 PM   Result Value Ref Range    WBC Count 7.3 4.0 - 11.0 10e3/uL    RBC Count 5.20 3.80 - 5.20 10e6/uL    Hemoglobin 14.7 11.7 - 15.7 g/dL    Hematocrit 44.6 35.0 - 47.0 %    MCV 86 78 - 100 fL    MCH 28.3 26.5 - 33.0 pg    MCHC 33.0 31.5 - 36.5 g/dL    RDW 12.3 10.0 - 15.0 %    Platelet Count 431 150 - 450 10e3/uL    % Neutrophils 41 %    % Lymphocytes 46 %    % Monocytes 9 %    % Eosinophils 4 %    % Basophils 1 %    % Immature Granulocytes 0 %    NRBCs per 100 WBC 0 <1 /100    Absolute Neutrophils 3.0 1.6 - 8.3 10e3/uL    Absolute Lymphocytes 3.3 0.8 - 5.3 10e3/uL    Absolute Monocytes 0.6 0.0 - 1.3 10e3/uL    Absolute Eosinophils 0.3 0.0 -  0.7 10e3/uL    Absolute Basophils 0.0 0.0 - 0.2 10e3/uL    Absolute Immature Granulocytes 0.0 <=0.4 10e3/uL    Absolute NRBCs 0.0 10e3/uL   TSH with free T4 reflex    Collection Time: 05/23/25  6:14 PM   Result Value Ref Range    TSH 0.78 0.50 - 4.30 uIU/mL   Comprehensive metabolic panel (BMP + Alb, Alk Phos, ALT, AST, Total. Bili, TP)    Collection Time: 06/05/25  5:11 PM   Result Value Ref Range    Sodium 138 135 - 145 mmol/L    Potassium 4.0 3.4 - 5.3 mmol/L    Carbon Dioxide (CO2) 21 (L) 22 - 29 mmol/L    Anion Gap 11 7 - 15 mmol/L    Urea Nitrogen 6.0 6.0 - 20.0 mg/dL    Creatinine 0.65 0.51 - 0.95 mg/dL    GFR Estimate >90 >60 mL/min/1.73m2    Calcium 9.3 8.8 - 10.4 mg/dL    Chloride 106 98 - 107 mmol/L    Glucose 81 70 - 99 mg/dL    Alkaline Phosphatase 38 (L) 40 - 150 U/L    AST 17 0 - 35 U/L    ALT 6 0 - 50 U/L    Protein Total 7.1 6.4 - 8.3 g/dL    Albumin 4.3 3.5 - 5.2 g/dL    Bilirubin Total 2.6 (H) <=1.2 mg/dL   Lipase    Collection Time: 06/05/25  5:11 PM   Result Value Ref Range    Lipase 27 13 - 60 U/L   CRP, inflammation    Collection Time: 06/05/25  5:11 PM   Result Value Ref Range    CRP Inflammation <3.00 <5.00 mg/L   ESR: Erythrocyte sedimentation rate    Collection Time: 06/05/25  5:11 PM   Result Value Ref Range    Erythrocyte Sedimentation Rate 7 0 - 20 mm/hr   CBC with platelets and differential    Collection Time: 06/05/25  5:11 PM   Result Value Ref Range    WBC Count 9.2 4.0 - 11.0 10e3/uL    RBC Count 4.21 3.80 - 5.20 10e6/uL    Hemoglobin 11.9 11.7 - 15.7 g/dL    Hematocrit 35.8 35.0 - 47.0 %    MCV 85 78 - 100 fL    MCH 28.3 26.5 - 33.0 pg    MCHC 33.2 31.5 - 36.5 g/dL    RDW 12.8 10.0 - 15.0 %    Platelet Count 384 150 - 450 10e3/uL    % Neutrophils 44 %    % Lymphocytes 45 %    % Monocytes 9 %    % Eosinophils 1 %    % Basophils 1 %    % Immature Granulocytes 0 %    NRBCs per 100 WBC 0 <1 /100    Absolute Neutrophils 4.1 1.6 - 8.3 10e3/uL    Absolute Lymphocytes 4.1 0.8 - 5.3  10e3/uL    Absolute Monocytes 0.8 0.0 - 1.3 10e3/uL    Absolute Eosinophils 0.1 0.0 - 0.7 10e3/uL    Absolute Basophils 0.1 0.0 - 0.2 10e3/uL    Absolute Immature Granulocytes 0.0 <=0.4 10e3/uL    Absolute NRBCs 0.0 10e3/uL   UA Macroscopic with reflex to Microscopic and Culture - Lab Collect    Collection Time: 06/05/25  5:13 PM    Specimen: Urine, Midstream   Result Value Ref Range    Color Urine Yellow Colorless, Straw, Light Yellow, Yellow    Appearance Urine Clear Clear    Glucose Urine Negative Negative mg/dL    Bilirubin Urine Negative Negative    Ketones Urine 40 (A) Negative mg/dL    Specific Gravity Urine 1.029 1.003 - 1.035    Blood Urine Negative Negative    pH Urine 6.5 5.0 - 7.0    Protein Albumin Urine Negative Negative mg/dL    Urobilinogen Urine Normal Normal mg/dL    Nitrite Urine Negative Negative    Leukocyte Esterase Urine Small (A) Negative    Bacteria Urine Few (A) None Seen /HPF    Mucus Urine Present (A) None Seen /LPF    RBC Urine 3 (H) <=2 /HPF    WBC Urine 5 <=5 /HPF    Squamous Epithelials Urine <1 <=1 /HPF   Helicobacter pylori Antigen Stool    Collection Time: 06/06/25 11:01 AM   Result Value Ref Range    Helicobacter pylori Antigen Stool Negative Negative   Calprotectin Feces    Collection Time: 06/06/25 11:01 AM   Result Value Ref Range    Calprotectin Feces 130.0 (H) 0.0 - 49.9 mg/kg   Enteric Bacteria and Virus Panel by [facility] Stool    Collection Time: 06/06/25 11:01 AM    Specimen: Per Rectum; Stool   Result Value Ref Range    Campylobacter species Negative Negative    Salmonella species Negative Negative    Vibrio species Negative Negative    Vibrio cholerae Negative Negative    Yersinia enterocolitica Negative Negative    Enteropathogenic E. coli (EPEC) Negative Negative, NA    Shiga-like toxin-producing E. coli (STEC) Negative Negative    Shigella/Enteroinvasive E. coli (EIEC) Negative Negative    Cryptosporidium species Negative Negative    Giardia lamblia Negative Negative     Norovirus Gl/Gll Negative Negative    Rotavirus A Negative Negative    Plesiomonas shigelloides Negative Negative    Enteroaggregative E. coli (EAEC) Negative Negative    Enterotoxigenic E. coli (ETEC) Negative Negative    E. coli O157 NA Negative, NA    Cyclospora cayetanensis Negative Negative    Entamoeba histolytica Negative Negative    Adenovirus F40/41 Negative Negative    Astrovirus Negative Negative    Sapovirus Negative Negative     US Pelvis Complete without Transvaginal  Narrative: Exam: US PELVIC TRANSABDOMINAL, 4/4/2025 1:22 PM    Indication: Right lower quadrant pain; Abdominal pain, right lower  quadrant    Comparison: 7/13/2024 CT     TECHNIQUE: The pelvis was scanned in standard fashion with a  transabdominal transducer(s) using both grey scale and limited color  Doppler techniques. Patient declined endovaginal component of the  examination.    FINDINGS:  Limited examination secondary to limited acoustic windows from  overlying bowel on transabdominal imaging.    The uterus measures 7.3 x 4.4 x 4.3 cm, and there is no evidence of a  focal fibroid.  The endometrium is within normal limits and measures 4  mm. There is no definite free fluid in the pelvis.    The right adnexa is predominantly obscured. The left ovary measures  2.5 x 1.2 x 1.5 cm with grossly normal sonographic gray scale  appearance and normal spectral waveform.    Visualization of the right lower quadrant with predominantly normal  peristalsing bowel. Within this limitation, there is no focal  abnormality.  Impression: IMPRESSION:   1.  Limited pelvic ultrasound secondary to overlying bowel gas and  transabdominal technique. The right adnexa is obscured. No focal  sonographic abnormality in the right lower quadrant.  2.  The uterus and left ovary are within normal limits.    I have personally reviewed the examination and initial interpretation  and I agree with the findings.    BERTRAM VALLEJO, DO         SYSTEM ID:  [id number]    CT  Abdomen Pelvis Enterography with IV Contrast 2/12/2025  Order: [id number]  Impression    1. No evidence of active inflammatory bowel disease.  2. Large volume stool within the colon including well formed stool within the rectum, likely due to constipation.  Narrative    EXAM:  CT ABDOMEN PELVIS ENTEROGRAPHY WITH IV CONTRAST    COMPARISON:  CT abdomen/pelvis 06/12/2024    FINDINGS:  Large amount stool throughout the colon. No definite colonic inflammation is seen. No wall thickening, mural hyperenhancement, or findings to suggest active inflammatory bowel disease involving the small bowel. No stricture or evidence of  obstruction. No penetrating disease. Normal perianal region.    The liver, spleen, pancreas, kidneys, and gallbladder are negative. No biliary dilatation. Patent portal and hepatic veins. Mesenteric vasculature is patent. Trace free fluid in the pelvis, likely physiologic. No suspicious osseous lesion. No  lymphadenopathy. Basically. Remainder negative.    CT Sinuses without IV Contrast 5/20/2025  Order: [id number]  Impression    Minimal bilateral maxillary mucosal thickening. No fluid levels. Ostiomeatal complex region is patent bilaterally. Rightward nasal septal deviation. Otherwise unremarkable.    Results for orders placed or performed in visit on 06/05/25   Comprehensive metabolic panel (BMP + Alb, Alk Phos, ALT, AST, Total. Bili, TP)     Status: Abnormal   Result Value Ref Range    Sodium 138 135 - 145 mmol/L    Potassium 4.0 3.4 - 5.3 mmol/L    Carbon Dioxide (CO2) 21 (L) 22 - 29 mmol/L    Anion Gap 11 7 - 15 mmol/L    Urea Nitrogen 6.0 6.0 - 20.0 mg/dL    Creatinine 0.65 0.51 - 0.95 mg/dL    GFR Estimate >90 >60 mL/min/1.73m2    Calcium 9.3 8.8 - 10.4 mg/dL    Chloride 106 98 - 107 mmol/L    Glucose 81 70 - 99 mg/dL    Alkaline Phosphatase 38 (L) 40 - 150 U/L    AST 17 0 - 35 U/L    ALT 6 0 - 50 U/L    Protein Total 7.1 6.4 - 8.3 g/dL    Albumin 4.3 3.5 - 5.2 g/dL    Bilirubin Total 2.6 (H)  <=1.2 mg/dL   Lipase     Status: Normal   Result Value Ref Range    Lipase 27 13 - 60 U/L   Helicobacter pylori Antigen Stool     Status: Normal   Result Value Ref Range    Helicobacter pylori Antigen Stool Negative Negative   Calprotectin Feces     Status: Abnormal   Result Value Ref Range    Calprotectin Feces 130.0 (H) 0.0 - 49.9 mg/kg   UA Macroscopic with reflex to Microscopic and Culture - Lab Collect     Status: Abnormal    Specimen: Urine, Midstream   Result Value Ref Range    Color Urine Yellow Colorless, Straw, Light Yellow, Yellow    Appearance Urine Clear Clear    Glucose Urine Negative Negative mg/dL    Bilirubin Urine Negative Negative    Ketones Urine 40 (A) Negative mg/dL    Specific Gravity Urine 1.029 1.003 - 1.035    Blood Urine Negative Negative    pH Urine 6.5 5.0 - 7.0    Protein Albumin Urine Negative Negative mg/dL    Urobilinogen Urine Normal Normal mg/dL    Nitrite Urine Negative Negative    Leukocyte Esterase Urine Small (A) Negative    Bacteria Urine Few (A) None Seen /HPF    Mucus Urine Present (A) None Seen /LPF    RBC Urine 3 (H) <=2 /HPF    WBC Urine 5 <=5 /HPF    Squamous Epithelials Urine <1 <=1 /HPF    Narrative    Urine Culture not indicated   CRP, inflammation     Status: Normal   Result Value Ref Range    CRP Inflammation <3.00 <5.00 mg/L   ESR: Erythrocyte sedimentation rate     Status: Normal   Result Value Ref Range    Erythrocyte Sedimentation Rate 7 0 - 20 mm/hr   CBC with platelets and differential     Status: None   Result Value Ref Range    WBC Count 9.2 4.0 - 11.0 10e3/uL    RBC Count 4.21 3.80 - 5.20 10e6/uL    Hemoglobin 11.9 11.7 - 15.7 g/dL    Hematocrit 35.8 35.0 - 47.0 %    MCV 85 78 - 100 fL    MCH 28.3 26.5 - 33.0 pg    MCHC 33.2 31.5 - 36.5 g/dL    RDW 12.8 10.0 - 15.0 %    Platelet Count 384 150 - 450 10e3/uL    % Neutrophils 44 %    % Lymphocytes 45 %    % Monocytes 9 %    % Eosinophils 1 %    % Basophils 1 %    % Immature Granulocytes 0 %    NRBCs per 100  WBC 0 <1 /100    Absolute Neutrophils 4.1 1.6 - 8.3 10e3/uL    Absolute Lymphocytes 4.1 0.8 - 5.3 10e3/uL    Absolute Monocytes 0.8 0.0 - 1.3 10e3/uL    Absolute Eosinophils 0.1 0.0 - 0.7 10e3/uL    Absolute Basophils 0.1 0.0 - 0.2 10e3/uL    Absolute Immature Granulocytes 0.0 <=0.4 10e3/uL    Absolute NRBCs 0.0 10e3/uL   Enteric Bacteria and Virus Panel by [facility] Stool     Status: Normal    Specimen: Per Rectum; Stool   Result Value Ref Range    Campylobacter species Negative Negative    Salmonella species Negative Negative    Vibrio species Negative Negative    Vibrio cholerae Negative Negative    Yersinia enterocolitica Negative Negative    Enteropathogenic E. coli (EPEC) Negative Negative, NA    Shiga-like toxin-producing E. coli (STEC) Negative Negative    Shigella/Enteroinvasive E. coli (EIEC) Negative Negative    Cryptosporidium species Negative Negative    Giardia lamblia Negative Negative    Norovirus Gl/Gll Negative Negative    Rotavirus A Negative Negative    Plesiomonas shigelloides Negative Negative    Enteroaggregative E. coli (EAEC) Negative Negative    Enterotoxigenic E. coli (ETEC) Negative Negative    E. coli O157 NA Negative, NA    Cyclospora cayetanensis Negative Negative    Entamoeba histolytica Negative Negative    Adenovirus F40/41 Negative Negative    Astrovirus Negative Negative    Sapovirus Negative Negative    Narrative    Assay performed using the FDA-cleared FilmArray GI Panel from [employer]  A negative result should not rule out infection in patients with a probability for gastrointestinal infection. The assay does not test for all potential infectious agents of diarrheal disease.  Positive results do not distinguish between a viable or replicating organism and the presence of a nonviable organism or nucleic acid, nor do they exclude the possibility of coinfection by organisms not in the panel.  Results are intended to aid in the diagnosis of illness and are meant to be  used in conjunction with other clinical findings.  This test has been verified and is performed by the [facility] at [facility]. This laboratory is certified under the Clinical Laboratory Improvement Amendments of 1988 (CLIA-88) as qualified to perform high complexity clinical laboratory testing.   CBC with platelets and differential     Status: None    Narrative    The following orders were created for panel order CBC with platelets and differential.  Procedure                               Abnormality         Status                     ---------                               -----------         ------                     CBC with platelets and ...[[id number]]                      Final result                 Please view results for these tests on the individual orders.         A/P;     1. Immunizations; Pfizer COVID x 3. Td/Tdap 8/19/2016  2. Crohn's disease on Humira. See [facility] note from Dr. Dominguez 2/20/2025. She had a flex sig at [facility] 2/13/2025. At this time, she does not have follow up at the [facility]. She states she has contacted them.   3. Depression/anxiety on Fluoxetine   4. On OCP's for contraception.   5. ENT appt. With Dr. Rojas 7/7/2025. Seen [facility] 5/20/2025 for nasal obstruction  6. Cardiology, Dr. Cruz, 5/1/2024 for POTS syndrome evaluation. Dr. Cruz did NOT feel that having a PICC line for IV fluids to manage her orthostatic sxs was appropriate. I could not find thatt a resting cardiac echo was ever done. Zio patch pending?   7. Dysphagia; EGD done at [facility] 2/13/2025. See [facility] note 1/23/2025; Speech Pathology   8. Seen [facility] 1/17/2025 for urinary incontinence   9. Outpatient Summary Rheumatology note from [facility] 2/21/2025: Screening serology for vasculitis, sarcoidosis, lupus and other connective tissue diseases all negative or normal. Acute phase reactants are also within normal limits. No indication  of an active rheumatic process at this time.   10. Fevers and bloody stools. Ordered labs and abdominal/pelvic CT scan. She wants to wait on being treated with steroids until discuss with GI from [facility].

## 2025-06-11 ENCOUNTER — HOME CARE VISIT (OUTPATIENT)
Dept: HOME HEALTH SERVICES | Facility: HOME HEALTH | Age: 20
End: 2025-06-11
Payer: COMMERCIAL

## 2025-06-11 ENCOUNTER — OFFICE VISIT (OUTPATIENT)
Dept: INTERNAL MEDICINE | Facility: CLINIC | Age: 20
End: 2025-06-11
Payer: COMMERCIAL

## 2025-06-11 ENCOUNTER — LAB (OUTPATIENT)
Dept: LAB | Facility: CLINIC | Age: 20
End: 2025-06-11
Payer: COMMERCIAL

## 2025-06-11 VITALS
SYSTOLIC BLOOD PRESSURE: 116 MMHG | HEART RATE: 89 BPM | TEMPERATURE: 97.9 F | HEIGHT: 62 IN | BODY MASS INDEX: 21.35 KG/M2 | DIASTOLIC BLOOD PRESSURE: 75 MMHG | WEIGHT: 116 LBS | OXYGEN SATURATION: 100 % | RESPIRATION RATE: 16 BRPM

## 2025-06-11 DIAGNOSIS — R53.83 OTHER FATIGUE: ICD-10-CM

## 2025-06-11 DIAGNOSIS — R50.9 FEVER, UNSPECIFIED FEVER CAUSE: Primary | ICD-10-CM

## 2025-06-11 DIAGNOSIS — R50.9 FEVER, UNSPECIFIED FEVER CAUSE: ICD-10-CM

## 2025-06-11 LAB
ALBUMIN UR-MCNC: NEGATIVE MG/DL
APPEARANCE UR: CLEAR
BACTERIA #/AREA URNS HPF: ABNORMAL /HPF
BASOPHILS # BLD AUTO: 0 10E3/UL (ref 0–0.2)
BASOPHILS NFR BLD AUTO: 0 %
BILIRUB UR QL STRIP: NEGATIVE
COLOR UR AUTO: YELLOW
CRP SERPL-MCNC: <3 MG/L
EOSINOPHIL # BLD AUTO: 0.3 10E3/UL (ref 0–0.7)
EOSINOPHIL NFR BLD AUTO: 4 %
ERYTHROCYTE [DISTWIDTH] IN BLOOD BY AUTOMATED COUNT: 13 % (ref 10–15)
ERYTHROCYTE [SEDIMENTATION RATE] IN BLOOD BY WESTERGREN METHOD: 7 MM/HR (ref 0–20)
GLUCOSE UR STRIP-MCNC: NEGATIVE MG/DL
HCG SERPL QL: NEGATIVE
HCT VFR BLD AUTO: 37 % (ref 35–47)
HGB BLD-MCNC: 12.3 G/DL (ref 11.7–15.7)
HGB UR QL STRIP: NEGATIVE
IMM GRANULOCYTES # BLD: 0 10E3/UL
IMM GRANULOCYTES NFR BLD: 0 %
KETONES UR STRIP-MCNC: NEGATIVE MG/DL
LEUKOCYTE ESTERASE UR QL STRIP: ABNORMAL
LIPASE SERPL-CCNC: 31 U/L (ref 13–60)
LYMPHOCYTES # BLD AUTO: 3.4 10E3/UL (ref 0.8–5.3)
LYMPHOCYTES NFR BLD AUTO: 43 %
MCH RBC QN AUTO: 28.5 PG (ref 26.5–33)
MCHC RBC AUTO-ENTMCNC: 33.2 G/DL (ref 31.5–36.5)
MCV RBC AUTO: 86 FL (ref 78–100)
MONOCYTES # BLD AUTO: 0.8 10E3/UL (ref 0–1.3)
MONOCYTES NFR BLD AUTO: 11 %
MUCOUS THREADS #/AREA URNS LPF: PRESENT /LPF
NEUTROPHILS # BLD AUTO: 3.3 10E3/UL (ref 1.6–8.3)
NEUTROPHILS NFR BLD AUTO: 42 %
NITRATE UR QL: NEGATIVE
NRBC # BLD AUTO: 0 10E3/UL
NRBC BLD AUTO-RTO: 0 /100
PH UR STRIP: 6.5 [PH] (ref 5–7)
PLATELET # BLD AUTO: 411 10E3/UL (ref 150–450)
RBC # BLD AUTO: 4.31 10E6/UL (ref 3.8–5.2)
RBC URINE: 1 /HPF
SP GR UR STRIP: 1.03 (ref 1–1.03)
SQUAMOUS EPITHELIAL: 2 /HPF
UROBILINOGEN UR STRIP-MCNC: 3 MG/DL
WBC # BLD AUTO: 7.9 10E3/UL (ref 4–11)
WBC URINE: 3 /HPF

## 2025-06-11 PROCEDURE — 85025 COMPLETE CBC W/AUTO DIFF WBC: CPT | Performed by: PATHOLOGY

## 2025-06-11 PROCEDURE — 81001 URINALYSIS AUTO W/SCOPE: CPT | Performed by: PATHOLOGY

## 2025-06-11 PROCEDURE — 3078F DIAST BP <80 MM HG: CPT | Performed by: INTERNAL MEDICINE

## 2025-06-11 PROCEDURE — 84703 CHORIONIC GONADOTROPIN ASSAY: CPT | Performed by: PATHOLOGY

## 2025-06-11 PROCEDURE — 86140 C-REACTIVE PROTEIN: CPT | Performed by: PATHOLOGY

## 2025-06-11 PROCEDURE — 87040 BLOOD CULTURE FOR BACTERIA: CPT | Performed by: INTERNAL MEDICINE

## 2025-06-11 PROCEDURE — 85652 RBC SED RATE AUTOMATED: CPT | Performed by: PATHOLOGY

## 2025-06-11 PROCEDURE — 86481 TB AG RESPONSE T-CELL SUSP: CPT | Performed by: INTERNAL MEDICINE

## 2025-06-11 PROCEDURE — 36415 COLL VENOUS BLD VENIPUNCTURE: CPT | Performed by: PATHOLOGY

## 2025-06-11 PROCEDURE — 99214 OFFICE O/P EST MOD 30 MIN: CPT | Performed by: INTERNAL MEDICINE

## 2025-06-11 PROCEDURE — 83615 LACTATE (LD) (LDH) ENZYME: CPT | Performed by: PATHOLOGY

## 2025-06-11 PROCEDURE — S9374 HIT HYDRA 1 LITER DIEM: HCPCS

## 2025-06-11 PROCEDURE — 3074F SYST BP LT 130 MM HG: CPT | Performed by: INTERNAL MEDICINE

## 2025-06-11 PROCEDURE — 83690 ASSAY OF LIPASE: CPT | Performed by: PATHOLOGY

## 2025-06-11 PROCEDURE — 99000 SPECIMEN HANDLING OFFICE-LAB: CPT | Performed by: PATHOLOGY

## 2025-06-11 NOTE — PROGRESS NOTES
Gume castle RN and stated she cannot be seen today. She has doctors appointments. Will update scheduling.

## 2025-06-12 ENCOUNTER — HOME CARE VISIT (OUTPATIENT)
Dept: HOME HEALTH SERVICES | Facility: HOME HEALTH | Age: 20
End: 2025-06-12
Payer: COMMERCIAL

## 2025-06-12 VITALS
DIASTOLIC BLOOD PRESSURE: 64 MMHG | TEMPERATURE: 97.8 F | RESPIRATION RATE: 18 BRPM | OXYGEN SATURATION: 99 % | HEART RATE: 94 BPM | SYSTOLIC BLOOD PRESSURE: 100 MMHG

## 2025-06-12 DIAGNOSIS — R10.84 ABDOMINAL PAIN, GENERALIZED: Primary | ICD-10-CM

## 2025-06-12 DIAGNOSIS — R53.83 OTHER FATIGUE: Primary | ICD-10-CM

## 2025-06-12 LAB
BACTERIA SPEC CULT: NORMAL
BACTERIA SPEC CULT: NORMAL
LDH SERPL L TO P-CCNC: 118 U/L (ref 0–250)

## 2025-06-12 NOTE — PROGRESS NOTES
Nursing Visit Note:  Nurse visit today for USPIV  for Jaimie Ortiz.     present during visit today: Not Applicable.    Intravenous Access:  Peripheral IV placed.      Note: pt doing well on IVF. patient has been going to many doctors appointments trying to figure out why patient has had on and off fevers and not feeling well along with some chest palpitations. patient had a cardiac monitor on and is waiting for results.  patient states has been going on for about three weeks     Saline administered: 20 (ml)    Supply Check:   Does the patient have all the supplies they need for the next visit?  Yes    Next visit plan: patient will call for next visit    Rosi Quarles RN 6/12/2025

## 2025-06-13 ENCOUNTER — ANCILLARY PROCEDURE (OUTPATIENT)
Dept: CT IMAGING | Facility: CLINIC | Age: 20
End: 2025-06-13
Attending: INTERNAL MEDICINE
Payer: COMMERCIAL

## 2025-06-13 ENCOUNTER — RESULTS FOLLOW-UP (OUTPATIENT)
Dept: INTERNAL MEDICINE | Facility: CLINIC | Age: 20
End: 2025-06-13

## 2025-06-13 DIAGNOSIS — R50.9 FEVER, UNSPECIFIED FEVER CAUSE: ICD-10-CM

## 2025-06-13 PROCEDURE — 74177 CT ABD & PELVIS W/CONTRAST: CPT | Mod: GC | Performed by: RADIOLOGY

## 2025-06-13 RX ORDER — IOPAMIDOL 755 MG/ML
70 INJECTION, SOLUTION INTRAVASCULAR ONCE
Status: COMPLETED | OUTPATIENT
Start: 2025-06-13 | End: 2025-06-13

## 2025-06-13 RX ADMIN — IOPAMIDOL 70 ML: 755 INJECTION, SOLUTION INTRAVASCULAR at 09:41

## 2025-06-13 NOTE — DISCHARGE INSTRUCTIONS

## 2025-06-14 ENCOUNTER — RESULTS FOLLOW-UP (OUTPATIENT)
Dept: PHARMACY | Facility: CLINIC | Age: 20
End: 2025-06-14

## 2025-06-16 LAB
BACTERIA SPEC CULT: NO GROWTH
BACTERIA SPEC CULT: NO GROWTH

## 2025-06-17 DIAGNOSIS — F42.9 OBSESSIVE-COMPULSIVE DISORDER, UNSPECIFIED TYPE: ICD-10-CM

## 2025-06-17 LAB — CV ZIO PRELIM RESULTS: NORMAL

## 2025-06-18 ENCOUNTER — MYC MEDICAL ADVICE (OUTPATIENT)
Dept: INTERNAL MEDICINE | Facility: CLINIC | Age: 20
End: 2025-06-18
Payer: COMMERCIAL

## 2025-06-18 PROCEDURE — S9374 HIT HYDRA 1 LITER DIEM: HCPCS

## 2025-06-18 RX ORDER — FLUOXETINE 10 MG/1
10 CAPSULE ORAL DAILY
Qty: 90 CAPSULE | Refills: 2 | Status: SHIPPED | OUTPATIENT
Start: 2025-06-18

## 2025-06-19 ENCOUNTER — HOME CARE VISIT (OUTPATIENT)
Dept: HOME HEALTH SERVICES | Facility: HOME HEALTH | Age: 20
End: 2025-06-19
Payer: COMMERCIAL

## 2025-06-19 VITALS
RESPIRATION RATE: 16 BRPM | HEART RATE: 92 BPM | OXYGEN SATURATION: 99 % | TEMPERATURE: 98.2 F | DIASTOLIC BLOOD PRESSURE: 82 MMHG | SYSTOLIC BLOOD PRESSURE: 110 MMHG

## 2025-06-19 RX ORDER — MONTELUKAST SODIUM 10 MG/1
10 TABLET ORAL AT BEDTIME
COMMUNITY

## 2025-06-20 NOTE — PROGRESS NOTES
Nursing Visit Note:  Nurse visit today for PIV start for Jaimie Chenfranciscajordi.     present during visit today: Not Applicable.    Intravenous Access:  Peripheral IV placed.      Note: patient alert and oriented. NAD. States pretty much back to baseline. Had some redness around IV site with previous IVs. Patient states she went in to have this evaluated last week and was told it just appeared to be irritated. Patient states redness taking about a week to resolve. Will continue to monitor. New PIV started today with ultrasound guidance. IV fluid infusing by end of visit.    Saline administered: 10 (ml)    Supply Check:   Does the patient have all the supplies they need for the next visit?  Yes    Next visit plan: 6/26/25. Patient will call to reschedule if needed.    Neymar Bledsoe RN 6/19/2025

## 2025-06-23 ENCOUNTER — HOME INFUSION (OUTPATIENT)
Dept: HOME HEALTH SERVICES | Facility: HOME HEALTH | Age: 20
End: 2025-06-23
Payer: COMMERCIAL

## 2025-06-25 ENCOUNTER — HOME CARE VISIT (OUTPATIENT)
Dept: HOME HEALTH SERVICES | Facility: HOME HEALTH | Age: 20
End: 2025-06-25
Payer: COMMERCIAL

## 2025-06-25 VITALS
TEMPERATURE: 97.8 F | RESPIRATION RATE: 16 BRPM | HEART RATE: 92 BPM | SYSTOLIC BLOOD PRESSURE: 110 MMHG | DIASTOLIC BLOOD PRESSURE: 70 MMHG | OXYGEN SATURATION: 98 %

## 2025-06-25 PROCEDURE — S9374 HIT HYDRA 1 LITER DIEM: HCPCS

## 2025-06-25 PROCEDURE — 99601 HOME NFS VISIT <2 HRS: CPT

## 2025-06-25 NOTE — PROGRESS NOTES
Nursing Visit Note:  Nurse visit today for PIV start for Jaimie CHANDA Ortiz.     present during visit today: Not Applicable.    Intravenous Access:  Peripheral IV placed.      Note: patient alert and oriented . NAD. Denies changes since last nurse visit. Continues to have intermittent palpitations. Still pending appointment. New ultrasound guided IV started today. Infusing by end of visit. Patient would like to cancel next three visits due to upcoming appointment at [facility] in mid July. patient is concerned that continuing IV hydration up to/prior to appointment will interfere with results at this appointment.    Saline administered: 10 (ml)    Supply Check:   Does the patient have all the supplies they need for the next visit?  Yes    Next visit plan: 7/23/25 per patient request. Patient will call if she needs visit sooner.    Neymar Bledsoe RN 6/25/2025

## 2025-06-30 ENCOUNTER — OFFICE VISIT (OUTPATIENT)
Dept: FAMILY MEDICINE | Facility: CLINIC | Age: 20
End: 2025-06-30
Payer: COMMERCIAL

## 2025-06-30 VITALS
HEIGHT: 63 IN | OXYGEN SATURATION: 100 % | SYSTOLIC BLOOD PRESSURE: 104 MMHG | HEART RATE: 90 BPM | WEIGHT: 117.8 LBS | DIASTOLIC BLOOD PRESSURE: 66 MMHG | RESPIRATION RATE: 18 BRPM | BODY MASS INDEX: 20.87 KG/M2 | TEMPERATURE: 97.3 F

## 2025-06-30 DIAGNOSIS — Z48.02 VISIT FOR SUTURE REMOVAL: Primary | ICD-10-CM

## 2025-06-30 DIAGNOSIS — R21 RASH: ICD-10-CM

## 2025-06-30 PROCEDURE — 99213 OFFICE O/P EST LOW 20 MIN: CPT | Mod: 25 | Performed by: PHYSICIAN ASSISTANT

## 2025-06-30 PROCEDURE — G2211 COMPLEX E/M VISIT ADD ON: HCPCS | Performed by: PHYSICIAN ASSISTANT

## 2025-06-30 PROCEDURE — 1126F AMNT PAIN NOTED NONE PRSNT: CPT | Performed by: PHYSICIAN ASSISTANT

## 2025-06-30 PROCEDURE — 3078F DIAST BP <80 MM HG: CPT | Performed by: PHYSICIAN ASSISTANT

## 2025-06-30 PROCEDURE — 3074F SYST BP LT 130 MM HG: CPT | Performed by: PHYSICIAN ASSISTANT

## 2025-06-30 ASSESSMENT — PAIN SCALES - GENERAL: PAINLEVEL_OUTOF10: NO PAIN (0)

## 2025-06-30 NOTE — PATIENT INSTRUCTIONS
Meghan Etienne,    Thank you for allowing [facility] to manage your care.    Your wound is healing well. Wash with soapy warm water once daily and place petroleum jelly on it 2-3 times daily to minimize scarring.    If you develop worsening/changing symptoms at any time, please be seen in clinic/urgent care for evaluation as we discussed.    If you have any questions or concerns, please feel free to call us at [phone]    Sincerely,    iRp Singh PA-C    Did you know?      You can schedule a video visit for follow-up appointments as well as future appointments for certain conditions.  Please see the below link.     [email or web address]    If you have not already done so,  I encourage you to sign up for [email or web address] ([email or web address]).  This will allow you to review your results, securely communicate with a provider, and schedule virtual visits as well.

## 2025-06-30 NOTE — PROGRESS NOTES
Assessment & Plan   Problem List Items Addressed This Visit    None  Visit Diagnoses         Visit for suture removal    -  Primary    Relevant Orders    REMOVAL OF SUTURES (Completed)      Rash        Relevant Orders    REMOVAL OF SUTURES (Completed)           Sutures removed as below with good tolerance of the procedure.  Patient's tetanus is up-to-date.  They have no evidence of wound infection or other complication. Bacitracin and bandage were applied. Plan to discharge with wound care instructions, over-the-counter analgesics, return precautions and follow-up with primary or myself as needed.    Complete history and physical exam as below. Afebrile with normal vital signs. All questions were answered at this time. Pt expressed understanding of and agreement with this dx, tx, and plan. No further workup warranted and standard medication warnings given. I have given the patient a list of pertinent indications for re-evaluation.   Follow-up  Return for a recheck as needed.    Subjective   Jaimie is a 19 year old, presenting for the following health issues:  Suture Removal        6/30/2025     1:25 PM   Additional Questions   Roomed by Alex Loo CMA   Accompanied by N/A         6/30/2025     1:25 PM   Patient Reported Additional Medications   Patient reports taking the following new medications No new medications     Suture Removal    History of Present Illness       Reason for visit:  Remove stitches   She is taking medications regularly.   - Jaimie Chenfranciscajordi, 19-year-old female  - Underwent a biopsy on the leg at [facility]  - Biopsy site healing well, no pain reported at the time of biopsy  - No fever or chills reported  - Rash around the biopsy site (reason for biopsy) unchanged.  Patient is coming in today due to needing stitches removed from the back of the left leg.  Placed about 10 days ago with no signs of infection.  Patient does indicate some yellow fluid leaking from site.      Review of  "Systems  Constitutional, derm systems are negative, except as otherwise noted.      Objective    /66   Pulse 90   Temp 97.3  F (36.3  C) (Temporal)   Resp 18   Ht 1.597 m (5' 2.87\")   Wt 53.4 kg (117 lb 12.8 oz)   LMP  (LMP Unknown)   SpO2 100%   BMI 20.95 kg/m    Body mass index is 20.95 kg/m .  Physical Exam  Vitals and nursing note reviewed.   Constitutional:       General: She is not in acute distress.     Appearance: She is not ill-appearing or diaphoretic.   HENT:      Head: Normocephalic and atraumatic.      Mouth/Throat:      Mouth: Mucous membranes are moist.   Eyes:      Conjunctiva/sclera: Conjunctivae normal.   Cardiovascular:      Rate and Rhythm: Normal rate and regular rhythm.      Heart sounds: Normal heart sounds. No murmur heard.     No friction rub. No gallop.   Pulmonary:      Effort: Pulmonary effort is normal. No respiratory distress.      Breath sounds: Normal breath sounds. No stridor. No wheezing, rhonchi or rales.   Skin:     General: Skin is warm and dry.      Comments: RLE: <1cm wound to mid posterior thigh with 3 nylon suture in place. Margins well approximated. Lacy erythematous rash superior to the wound without fluctuance, drainage, warmth or other overlying signs of trauma or infection.    Neurological:      General: No focal deficit present.      Mental Status: She is alert. Mental status is at baseline.   Psychiatric:         Mood and Affect: Mood normal.         Behavior: Behavior normal.          3 nylon sutures were removed from the wound without complication using a suture removal kit after cleaning with isopropyl alcohol. Bacitracin ointment and sterile bandage were placed.        Signed Electronically by: HILDA Palma    "

## 2025-07-06 ENCOUNTER — OFFICE VISIT (OUTPATIENT)
Dept: URGENT CARE | Facility: URGENT CARE | Age: 20
End: 2025-07-06
Payer: COMMERCIAL

## 2025-07-06 VITALS
OXYGEN SATURATION: 99 % | RESPIRATION RATE: 16 BRPM | WEIGHT: 119 LBS | SYSTOLIC BLOOD PRESSURE: 121 MMHG | HEART RATE: 85 BPM | DIASTOLIC BLOOD PRESSURE: 82 MMHG | TEMPERATURE: 98.1 F | HEIGHT: 62 IN | BODY MASS INDEX: 21.9 KG/M2

## 2025-07-06 DIAGNOSIS — L08.9 WOUND INFECTION: Primary | ICD-10-CM

## 2025-07-06 DIAGNOSIS — T14.8XXA WOUND INFECTION: Primary | ICD-10-CM

## 2025-07-06 PROCEDURE — 99213 OFFICE O/P EST LOW 20 MIN: CPT | Performed by: FAMILY MEDICINE

## 2025-07-06 PROCEDURE — 1126F AMNT PAIN NOTED NONE PRSNT: CPT | Performed by: FAMILY MEDICINE

## 2025-07-06 PROCEDURE — 3079F DIAST BP 80-89 MM HG: CPT | Performed by: FAMILY MEDICINE

## 2025-07-06 PROCEDURE — 3074F SYST BP LT 130 MM HG: CPT | Performed by: FAMILY MEDICINE

## 2025-07-06 RX ORDER — CEPHALEXIN 500 MG/1
500 CAPSULE ORAL 2 TIMES DAILY
Qty: 14 CAPSULE | Refills: 0 | Status: SHIPPED | OUTPATIENT
Start: 2025-07-06 | End: 2025-07-13

## 2025-07-06 ASSESSMENT — PAIN SCALES - GENERAL: PAINLEVEL_OUTOF10: NO PAIN (0)

## 2025-07-06 NOTE — PATIENT INSTRUCTIONS
Use antibiotic ointment about 3x daily , change bandage    Hold prescription for oral antibiotic, fill if needed ( unlikely )    Follow up as needed based on symptoms

## 2025-07-06 NOTE — PROGRESS NOTES
Urgent Care Clinic Visit    Chief Complaint   Patient presents with    Urgent Care    Wound Check     Possible wound infection from a biopsy x's 1 week                 7/6/2025    10:58 AM   Additional Questions   Roomed by ca   Accompanied by self           Patient had biopsy done on leg about 3 weeks ago, rash    Had 3 stitches in     Taken out about 6 days ago    Per patient the biopsy showed inflammation    Patient has Crohns    Full physical not done     Mentation and affect are fine    No tremor of speech or extremity    Patient removed the bandaid from back or left proximal thigh    There is only a cm of redness present at biopsy site, very minimally tender    In center of redness is a 2mm white [place] but this is likely just a healing wound    No drainage when squeezed    No surrounding cellulitis    Some of the rash still present ( this is why biopsy was done ) but the area around biopsy site is quite clear now    ASSESSMENT / PLAN:  (T14.8XXA,  L08.9) Wound infection  (primary encounter diagnosis)  Comment: very minimal infection if any.  I did print prescription for oral antibiotic but she will not likely need this.  Only fill if worsening.  For now just use topical bacitracin ointment and bandage as needed.   Plan: cephALEXin (KEFLEX) 500 MG capsule           See AVS       I reviewed the patient's medications, allergies, medical history, family history, and social history.    Eddie Steinberg MD

## 2025-07-07 DIAGNOSIS — K29.71 GASTRITIS WITH HEMORRHAGE, UNSPECIFIED CHRONICITY, UNSPECIFIED GASTRITIS TYPE: ICD-10-CM

## 2025-07-07 DIAGNOSIS — K59.00 CONSTIPATION, UNSPECIFIED CONSTIPATION TYPE: ICD-10-CM

## 2025-07-07 DIAGNOSIS — R10.84 ABDOMINAL PAIN, GENERALIZED: ICD-10-CM

## 2025-07-07 DIAGNOSIS — K58.2 IRRITABLE BOWEL SYNDROME WITH BOTH CONSTIPATION AND DIARRHEA: ICD-10-CM

## 2025-07-07 DIAGNOSIS — K20.90 ESOPHAGITIS DETERMINED BY BIOPSY: ICD-10-CM

## 2025-07-07 DIAGNOSIS — Z30.09 GENERAL COUNSELING AND ADVICE ON CONTRACEPTIVE MANAGEMENT: ICD-10-CM

## 2025-07-07 RX ORDER — NORETHINDRONE ACETATE AND ETHINYL ESTRADIOL .03; 1.5 MG/1; MG/1
1 TABLET ORAL DAILY
Qty: 84 TABLET | Refills: 0 | Status: SHIPPED | OUTPATIENT
Start: 2025-07-07 | End: 2025-07-10

## 2025-07-07 NOTE — TELEPHONE ENCOUNTER
Requested Prescriptions   Pending Prescriptions Disp Refills    norethindrone-ethinyl estradiol (MICROGESTIN 1.5/30) 1.5-30 MG-MCG tablet 84 tablet 4     Sig: Take 1 tablet by mouth daily. . Active tabs only, skip placebo pills. Take continuously.       Contraceptives Protocol Failed - 7/7/2025  4:10 PM        Failed - Recent (12 month) or future (90 days) visit with authorizing provider's specialty (provided they have been seen in the past 15 months)     The patient must have completed an in-person or virtual visit within the past 12 months or has a future visit scheduled within the next 90 days with the authorizing provider s specialty.  Urgent care and e-visits do not qualify as an office visit for this protocol.          Failed - Medication indicated for associated diagnosis     Medication is associated with one or more of the following diagnoses:  Contraception  Acne  Dysmenorrhea  Menorrhagia  Amenorrhea  PCOS  Premenstrual Dysphoric Disorder  Irregular menses  Endometriosis  Contraceptive counseling  Finding of menstrual bleeding  Education about oral contraception  Uses contraception  Initial prescription of oral contraception  Oral contraception-no problem  Oral contraceptive repeat          Passed - Patient is not a current smoker if age is 35 or older        Passed - Medication is active on med list and the sig matches. RN to manually verify dose and sig if red X/fail.     If the protocol passes (green check), you do not need to verify med dose and sig.    A prescription matches if they are the same clinical intention.    For Example: once daily and every morning are the same.    The protocol can not identify upper and lower case letters as matching and will fail.     For Example: Take 1 tablet (50 mg) by mouth daily     TAKE 1 TABLET (50 MG) BY MOUTH DAILY    For all fails (red x), verify dose and sig.    If the refill does match what is on file, the RN can still proceed to approve the refill  request.       If they do not match, route to the appropriate provider.             Passed - No active pregnancy on record        Passed - No positive pregnancy test in past 12 months           Last Written Prescription Date:  6/20/24  Last Fill Quantity: 84,  # refills: 4   Last office visit: 6/20/2024 ; last virtual visit: Visit date not found with prescribing provider:  Ninoska Alexander   Future Office Visit:      Upcoming appt with Kimberli Leigh    Medication is being filled for 1 time refill only due to:  Patient needs to be seen because it has been more than one year since last visit.  Ruy Agarwal RN on 7/7/2025 at 4:31 PM   [facility]

## 2025-07-09 ENCOUNTER — TELEPHONE (OUTPATIENT)
Dept: FAMILY MEDICINE | Facility: CLINIC | Age: 20
End: 2025-07-09
Payer: COMMERCIAL

## 2025-07-09 NOTE — TELEPHONE ENCOUNTER
Reason for Call:  Appointment Request    Patient requesting this type of appt:  has an infection in the are of where the stitches were removed. Was seen at  on Sunday and still not going away, getting worse and would like to see Chema.    Requested provider: Chema Singh    Reason patient unable to be scheduled: Not within requested timeframe    When does patient want to be seen/preferred time: 1-2 days    Comments: please let patient know if is possible. Wants to see the same provider who took out her stitches per patient.     Could we send this information to you in [employer] or would you prefer to receive a phone call?:   Patient would like to be contacted via [employer]    Call taken on 7/9/2025 at 12:34 PM by Melody Cook

## 2025-07-10 ENCOUNTER — OFFICE VISIT (OUTPATIENT)
Dept: OBGYN | Facility: CLINIC | Age: 20
End: 2025-07-10
Payer: COMMERCIAL

## 2025-07-10 VITALS — DIASTOLIC BLOOD PRESSURE: 62 MMHG | SYSTOLIC BLOOD PRESSURE: 110 MMHG | BODY MASS INDEX: 21.58 KG/M2 | WEIGHT: 118 LBS

## 2025-07-10 DIAGNOSIS — Z30.09 GENERAL COUNSELING AND ADVICE ON CONTRACEPTIVE MANAGEMENT: ICD-10-CM

## 2025-07-10 DIAGNOSIS — R10.2 PELVIC PAIN: Primary | ICD-10-CM

## 2025-07-10 RX ORDER — PANTOPRAZOLE SODIUM 40 MG/1
40 TABLET, DELAYED RELEASE ORAL DAILY
Qty: 90 TABLET | Refills: 0 | OUTPATIENT
Start: 2025-07-10

## 2025-07-10 RX ORDER — NORETHINDRONE ACETATE AND ETHINYL ESTRADIOL .03; 1.5 MG/1; MG/1
1 TABLET ORAL DAILY
Qty: 84 TABLET | Refills: 4 | Status: SHIPPED | OUTPATIENT
Start: 2025-07-10

## 2025-07-10 NOTE — TELEPHONE ENCOUNTER
"Last Written Prescription:     pantoprazole (PROTONIX) 40 MG EC tablet 90 tablet 0 12/26/2024 -- No   Sig - Route: Take 1 tablet (40 mg) by mouth daily. - Oral     ----------------------  Last Visit Date: 11/21/24 [facility]  Future Visit Date: None  ----------------------  Per chart review: 4/29/25 \"Patient has transferred IBD care to [place]. She has canceled upcoming MTM and GI visits. Will officially discharge from [facility]. Will route to [facility] as FYI, care team already aware she has transferred care.    Myke Segura, PharmD, BCPS\"     Refill decision:   [] Medication refilled per  Medication Refill in Ambulatory Care  policy.  [x] Medication unable to be refilled by RN due to: Other: Patient has   Transferred GI care to the [facility].       Request from pharmacy:  Requested Prescriptions   Pending Prescriptions Disp Refills    pantoprazole (PROTONIX) 40 MG EC tablet [Pharmacy Med Name: PANTOPRAZOLE 40MG TABLETS] 90 tablet 0     Sig: TAKE 1 TABLET(40 MG) BY MOUTH DAILY       PPI Protocol Passed - 7/10/2025  8:26 AM        Passed - Medication is active on med list and the sig matches. RN to manually verify dose and sig if red X/fail.     If the protocol passes (green check), you do not need to verify med dose and sig.    A prescription matches if they are the same clinical intention.    For Example: once daily and every morning are the same.    The protocol can not identify upper and lower case letters as matching and will fail.     For Example: Take 1 tablet (50 mg) by mouth daily     TAKE 1 TABLET (50 MG) BY MOUTH DAILY    For all fails (red x), verify dose and sig.    If the refill does match what is on file, the RN can still proceed to approve the refill request.       If they do not match, route to the appropriate provider.             Passed - Medication indicated for associated diagnosis     The medication is prescribed for one or more of the following conditions:     Erosive esophagitis "    Gastritis   Gastric hypersecretion   Gastric ulcer   Gastroesophageal reflux disease   Helicobacter pylori gastrointestinal tract infection   Ulcer of duodenum   Drug-induced peptic ulcer   Esophageal stricture   Gastrointestinal hemorrhage   Indigestion   Stress ulcer   Zollinger-Gupta syndrome   Chan s esophagus   Laryngopharyngeal reflux   Epigastric Pain   Morbid Obesity   Cough   History of Peptic Ulcer   Esophageal Atresia, Stenosis and Fistula   Cystic Fibrosis  Bronchiectasis          Passed - Recent (12 month) or future (90 days) visit with authorizing provider's specialty (provided they have been seen in the past 15 months)     The patient must have completed an in-person or virtual visit within the past 12 months or has a future visit scheduled within the next 90 days with the authorizing provider s specialty.  Urgent care and e-visits do not qualify as an office visit for this protocol.          Passed - Patient is age 18 or older        Passed - No active pregnacy on record        Passed - No positive pregnancy test in past 12 months

## 2025-07-10 NOTE — PROGRESS NOTES
"  SUBJECTIVE:                                                   Jaimie Ortiz is a 20 year old female who presents to clinic today for the following health issue(s):  Patient presents with:  Consult      HPI:  Pt here today with concerns of pelvic pain.   It peaked a few days ago while stretching.   She's on OCP's continuously. No BTB.   Was rx'd keflex on 7/6/25 for an infected punch biopsy site that was done at [facility] on 6/19/25 on her left thigh. She hasn't started the oral med-has been managing with a topical abx.     Hx Colitis-in remission.     Her pain in her right pelvis she describes as \"ripping\". She does have pain with bowel and bladder function.     Does not tolerate tampons. Not sexually active. Hasn't tolerated TVUS in the past. Has always had transabdominal.   Last US in April 2025 showed:  Limited examination secondary to limited acoustic windows from  overlying bowel on transabdominal imaging.     The uterus measures 7.3 x 4.4 x 4.3 cm, and there is no evidence of a  focal fibroid.  The endometrium is within normal limits and measures 4  mm. There is no definite free fluid in the pelvis.     The right adnexa is predominantly obscured. The left ovary measures  2.5 x 1.2 x 1.5 cm with grossly normal sonographic gray scale  appearance and normal spectral waveform.     Visualization of the right lower quadrant with predominantly normal  peristalsing bowel. Within this limitation, there is no focal  abnormality.    There was no mention of her ovaries on her recent CT in June.      No LMP recorded (lmp unknown). (Menstrual status: Birth Control)..     Patient is not sexually active, No obstetric history on file..  Using not sexually active for contraception.    reports that she has never smoked. She has never been exposed to tobacco smoke. She has never used smokeless tobacco.    STD testing offered?  Declined    Health maintenance updated:  yes    Today's PHQ-2 Score:       2/11/2025     8:55 AM "   PHQ-2 ( 1999 [employer])   Q1: Little interest or pleasure in doing things 0   Q2: Feeling down, depressed or hopeless 0   PHQ-2 Score 0     Today's PHQ-9 Score:       5/17/2024    11:17 AM   PHQ-9 SCORE   PHQ-9 Total Score 0     Today's JOSEPH-7 Score:       5/17/2024    11:17 AM   JOSEPH-7 SCORE   Total Score 2       Problem list and histories reviewed & adjusted, as indicated.  Additional history: as documented.    Patient Active Problem List   Diagnosis    Polyp of maxillary sinus    Allergy to mold spores    Seasonal allergic rhinitis    Diagnostic skin and sensitization tests(aka ALLERGENS)    Neck pain    Polymorphous light eruption    Autoeczematization    Mixed obsessional thoughts and acts    Unspecified mood (affective) disorder    Chronic tonsillitis    Weakness of both lower extremities    Abdominal pain, generalized    Diarrhea, unspecified type    Esophagitis determined by biopsy    Crohn's disease of colon without complication (H)    Gilbert's syndrome    Irritable bowel syndrome with both constipation and diarrhea    Gastritis with hemorrhage, unspecified chronicity, unspecified gastritis type    Crohn's colitis (H)    Chronic idiopathic constipation     Past Surgical History:   Procedure Laterality Date    CAPSULE/PILL CAM ENDOSCOPY N/A 02/03/2021    Procedure: VIDEO CAPSULE ENDOSCOPY;  Surgeon: Marily Lane MD;  [facility]     COLONOSCOPY N/A 12/16/2020    Procedure: COLONOSCOPY, WITH POLYPECTOMY AND BIOPSY;  Surgeon: Marily Lane MD;  Location: [facility]     COLONOSCOPY N/A 07/13/2021    Procedure: COLONOSCOPY, WITH POLYPECTOMY AND BIOPSY;  Surgeon: Marily Lane MD;  Location: [facility]     COLONOSCOPY N/A 08/01/2022    Procedure: COLONOSCOPY, WITH POLYPECTOMY AND BIOPSY;  Surgeon: Marily Lane MD;  Location: [facility]     ESOPHAGOSCOPY, GASTROSCOPY, DUODENOSCOPY (EGD), COMBINED N/A 12/16/2020    Procedure: upper endoscopy, WITH BIOPSY;  Surgeon: Marily Lane MD;   [facility]: [facility]     ESOPHAGOSCOPY, GASTROSCOPY, DUODENOSCOPY (EGD), COMBINED N/A 07/13/2021    Procedure: ESOPHAGOGASTRODUODENOSCOPY, WITH BIOPSY;  Surgeon: Marily Lane MD;  [facility]: [facility]     ESOPHAGOSCOPY, GASTROSCOPY, DUODENOSCOPY (EGD), COMBINED N/A 08/01/2022    Procedure: ESOPHAGOGASTRODUODENOSCOPY, WITH BIOPSY;  Surgeon: Marily Lane MD;  Location: [facility]     ORTHOPEDIC SURGERY  2010    Trigger thumb    TN HAND/FINGER SURGERY UNLISTED  2013    Trigger thumb    TONSILLECTOMY, ADENOIDECTOMY, COMBINED Bilateral 12/19/2019    Procedure: Bilateral TONSILLECTOMY, possible adenoidectomy;  Surgeon: Markus Rojas MD;  Location:  OR      Social History     Tobacco Use    Smoking status: Never     Passive exposure: Never    Smokeless tobacco: Never   Substance Use Topics    Alcohol use: No      Problem (# of Occurrences) Relation (Name,Age of Onset)    Eye Disorder (1) Mother (Mai)    Hypertension (2) Maternal Grandfather, Paternal Grandmother (Shelby)    Breast Cancer (1) Maternal Grandmother (Minerva)    Other Cancer (1) Father (Gustavo): Brain    Hyperlipidemia (1) Maternal Grandmother (Minerva)    Crohn's Disease (1) Other (Dad's side of family)    Ulcerative Colitis (1) Other (Dad's side of family)    Colon Polyps (1) Other (Dad's side of family)    Colon Cancer (1) Other (Dad's side of family)    Brain Cancer (1) Father (Gustavo)              Current Outpatient Medications   Medication Sig Dispense Refill    acetaminophen (TYLENOL) 325 MG tablet Take 2 tablets (650 mg) 30 minutes before injecting Humira. 12 tablet 1    BANOPHEN 25 MG capsule TAKE 1 CAPSULE BY MOUTH 30 MINUTES BEFORE INJECTING HUMIRA      cephALEXin (KEFLEX) 500 MG capsule Take 1 capsule (500 mg) by mouth 2 times daily for 7 days. 14 capsule 0    COMPOUNDED NON-CONTROLLED SUBSTANCE (CMPD RX) - PHARMACY TO MIX COMPOUNDED MEDICATION Apply 20 mLs into each nare 2 times daily Gentamicin/Dexamethasone  "160/120mg/liter saline 1000 mL 12    cyanocobalamin (CYANOCOBALAMIN) 1000 mcg/mL injection Inject 1,000 mcg into the muscle every 30 days.      dicyclomine (BENTYL) 10 MG capsule Take 1 capsule (10 mg) by mouth 4 times daily as needed (abdominal pain). Slowly wean off as directed. 120 capsule 3    diphenhydrAMINE (BENADRYL) 25 MG tablet Take 1 tablet (25 mg) 30 minutes before injecting Humira 25 tablet 1    Emergency Supply Kit, PIV, Patient use for emergency only. Contents: 3 sodium chloride 0.9% flushes, 1 IV start kit, 1 microclave ext set 14\", 1 each IV Cath 22 G/1\" and 24G/3/4\", 6 alcohol prep pads, 4 nitrile gloves (med). Call [phone] to reorder. 150109 kit 0    EPINEPHrine (ANY BX GENERIC EQUIV) 0.3 MG/0.3ML injection 2-pack Inject 0.3 mLs (0.3 mg) into the muscle as needed for anaphylaxis. May repeat one time in 5-15 minutes if response to initial dose is inadequate. 2 each 0    fluocinonide (LIDEX) 0.05 % external ointment Apply topically 2 times daily Apply as directed 60 g 0    gabapentin (NEURONTIN) 300 MG capsule Take 300 mg by mouth 2 times daily.      gentamicin (GARAMYCIN) 0.1 % external ointment Apply topically 2 times daily Mixed with lidex ointment to areas of rash 30 g 0    HUMIRA, 2 PEN, 40 MG/0.4ML pen kit Inject 0.4 mLs (40 mg) subcutaneously every 14 days. 0.8 mL 5    hydrOXYzine HCl (ATARAX) 10 MG tablet Take 2 tablets (20 mg) by mouth at bedtime 180 tablet 2    mometasone (ELOCON) 0.1 % external ointment Apply twice daily for 2 weeks for sun rash on chest and arms and ears 45 g 3    montelukast (SINGULAIR) 10 MG tablet Take 10 mg by mouth at bedtime.      norethindrone-ethinyl estradiol (MICROGESTIN 1.5/30) 1.5-30 MG-MCG tablet Take 1 tablet by mouth daily. . Active tabs only, skip placebo pills. Take continuously. 84 tablet 4    ondansetron (ZOFRAN ODT) 4 MG ODT tab Take 1 tablet (4 mg) by mouth every 8 hours as needed for nausea 12 tablet 3    ondansetron (ZOFRAN ODT) 8 MG ODT tab " "Take 1 tablet (8 mg) by mouth every 8 hours as needed for nausea. 15 tablet 1    pantoprazole (PROTONIX) 40 MG EC tablet Take 1 tablet (40 mg) by mouth daily. 90 tablet 0    senna (SENNA LAX) 8.6 MG tablet Take 1 tablet by mouth daily 30 tablet 5    sodium chloride 0.9 % in 1,000 mL via gravity infusion Infuse over 2-4 hours into the vein once a week. Infuse 1 bag(s) (1000 mL). Each bag to infuse over 2-4 hours via gravity 407944 mL 0    sodium chloride, PF, 0.9% PF flush Inject 10 mLs into the vein as needed for line flush. Flush IV before and after medication administration as directed and/or at least every 12 hours. 983062 mL 0    tacrolimus (PROTOPIC) 0.1 % external ointment Apply topically.      VTAMA 1 % CREA Apply 1 Application topically daily as needed Stop for pregnancy 60 g 11    famotidine (PEPCID) 20 MG tablet Take 1 tablet (20 mg) by mouth At Bedtime (Patient not taking: Reported on 7/10/2025) 30 tablet 3     No current facility-administered medications for this visit.     Allergies   Allergen Reactions    Gluten Meal Anaphylaxis     Throat gets sores and swelling    Other reaction(s): GI Upset   Sensitivity, avoiding    Sensitivity, avoiding      Other reaction(s): GI Upset Sensitivity, avoiding    Infliximab Anaphylaxis     Pt will still be getting it. Give pre-meds and give over 2 hours      Other Drug Allergy (See Comments) GI Disturbance     Sorbitol-Mannitol-Xanthan Gum - Patient experienced nausea, dizziness, and felt all over body \"tingling\" - discontinued Breeza and continued with water  2/12/2025    Tamiflu [Oseltamivir] Rash       ROS:  12 point review of systems negative other than symptoms noted below or in the HPI.  Genitourinary: Pelvic Pain  No urinary frequency or dysuria, bladder or kidney problems      OBJECTIVE:     /62   Wt 53.5 kg (118 lb)   LMP  (LMP Unknown)   BMI 21.58 kg/m    Body mass index is 21.58 kg/m .    Exam:  Constitutional:  Appearance: Well nourished, well " developed alert, in no acute distress  Gastrointestinal:  Abdominal Examination:  Abdomen TENDER to palpation RLQ, PT STATES THAT THE PAIN SHOOTS DOWN HER LEG, tone normal without rigidity or guarding, no masses present, umbilicus without lesions; Liver/Spleen:  No hepatomegaly present, liver nontender to palpation; Hernias:  No hernias present  Neurologic:  Mental Status:  Oriented X3.  Normal strength and tone, sensory exam grossly normal, mentation intact and speech normal.    Psychiatric:  Mentation appears normal and affect normal/bright.     In-Clinic Test Results:  No results found for this or any previous visit (from the past 24 hours).    ASSESSMENT/PLAN:                                                        ICD-10-CM    1. Pelvic pain  R10.2 US Transvaginal Pelvic Non-OB     US Pelvis Transabdominal      2. General counseling and advice on contraceptive management  Z30.09 norethindrone-ethinyl estradiol (MICROGESTIN 1.5/30) 1.5-30 MG-MCG tablet          There are no Patient Instructions on file for this visit.    19 yo female with consistent right pelvic pain. We haven't been able to visualize her right ovary on recent imaging. Discussed internal pelvic exam-pt declined today. Discussed that if the transabdominal US is unsuccessful we would progress to a pelvic MRI-I'm suspicious of endo- pt states that her genetic counselor was also suspecting endometriosis. We will keep her on her OCP's continuously for now. Refills sent.     ASHLIE Renee CNP [facility] [place]

## 2025-07-11 PROBLEM — K59.00 CONSTIPATION: Status: ACTIVE | Noted: 2022-11-16

## 2025-07-11 PROBLEM — T81.9XXA POST-OPERATIVE COMPLICATION: Status: ACTIVE | Noted: 2023-09-06

## 2025-07-11 PROBLEM — F39 UNSPECIFIED MOOD (AFFECTIVE) DISORDER: Status: RESOLVED | Noted: 2018-08-27 | Resolved: 2025-07-11

## 2025-07-11 PROBLEM — M25.50 PAIN IN JOINT: Status: ACTIVE | Noted: 2025-02-20

## 2025-07-11 RX ORDER — MONTELUKAST SODIUM 10 MG/1
1 TABLET ORAL AT BEDTIME
COMMUNITY
Start: 2025-06-19

## 2025-07-11 RX ORDER — PIMECROLIMUS 10 MG/G
CREAM TOPICAL
COMMUNITY

## 2025-07-11 RX ORDER — DUPILUMAB 300 MG/2ML
300 INJECTION, SOLUTION SUBCUTANEOUS
COMMUNITY
Start: 2025-06-27

## 2025-07-11 RX ORDER — GABAPENTIN 100 MG/1
CAPSULE ORAL
COMMUNITY
Start: 2025-06-18

## 2025-07-11 NOTE — PROGRESS NOTES
"Jaimie Ortiz is a 20 year old female who is being evaluated via a patient initiated billable telephone visit.     What phone number would you like to be contacted at? [phone]  How would you like to obtain your AVS? Mus[place]art      SUBJECTIVE:                                                   Jaimie Ortiz is a 20 year old female who presents for virtual visit today for the following health issue(s):  Patient presents with:  Follow Up        Virtual Visit Details    Type of service:  Telephone Visit   Phone call duration: 8 minutes   Originating Location (pt. Location): Home    Distant Location (provider location):  On-site  Telephone visit completed due to the patient did not consent to a video visit.        HPI:  Phone call with patient to go over recent pelvic ultrasound.   We saw her 2 weeks ago and at that time she had right sided pelvic pain that she described as \"ripping\".   She's on OCP's continuously. No bleeding. Does not tolerate TV US. Her US on 7/18 was transabdominal.   She is also requesting a referral for PFPT-had one from [facility] but lost it.     She is also working with GI to change from [facility] to [facility] for her Crohns.     No LMP recorded (lmp unknown). (Menstrual status: Birth Control)..     Patient is not sexually active, No obstetric history on file..  Using oral contraceptives for contraception.    reports that she has never smoked. She has never been exposed to tobacco smoke. She has never used smokeless tobacco.    Health maintenance updated:  yes    Today's PHQ-2 Score:       2/11/2025     8:55 AM   PHQ-2 ( 1999 [employer])   Q1: Little interest or pleasure in doing things 0   Q2: Feeling down, depressed or hopeless 0   PHQ-2 Score 0     Today's PHQ-9 Score:       5/17/2024    11:17 AM   PHQ-9 SCORE   PHQ-9 Total Score 0     Today's JOSEPH-7 Score:       5/17/2024    11:17 AM   JOSEPH-7 SCORE   Total Score 2       Problem list and histories reviewed & adjusted, as indicated.  Additional " history: as documented.    Patient Active Problem List   Diagnosis    Polyp of maxillary sinus    Allergy to mold spores    Seasonal allergic rhinitis    Diagnostic skin and sensitization tests(aka ALLERGENS)    Neck pain    Polymorphous light eruption    Autoeczematization    Mixed obsessional thoughts and acts    Chronic tonsillitis    Weakness of both lower extremities    Abdominal pain, generalized    Diarrhea, unspecified type    Esophagitis determined by biopsy    Crohn's disease of colon without complication (H)    Gilbert's syndrome    Irritable bowel syndrome with both constipation and diarrhea    Gastritis with hemorrhage, unspecified chronicity, unspecified gastritis type    Crohn's colitis (H)    Chronic idiopathic constipation    Post-operative complication    Pain in joint    Constipation     Past Surgical History:   Procedure Laterality Date    CAPSULE/PILL CAM ENDOSCOPY N/A 02/03/2021    Procedure: VIDEO CAPSULE ENDOSCOPY;  Surgeon: Marily Lane MD;  [facility]     COLONOSCOPY N/A 12/16/2020    Procedure: COLONOSCOPY, WITH POLYPECTOMY AND BIOPSY;  Surgeon: Marily Lane MD;  [facility]: [facility]     COLONOSCOPY N/A 07/13/2021    Procedure: COLONOSCOPY, WITH POLYPECTOMY AND BIOPSY;  Surgeon: Marily Lane MD;  [facility]: [facility]     COLONOSCOPY N/A 08/01/2022    Procedure: COLONOSCOPY, WITH POLYPECTOMY AND BIOPSY;  Surgeon: Marily Lane MD;  [facility]: [facility]     ESOPHAGOSCOPY, GASTROSCOPY, DUODENOSCOPY (EGD), COMBINED N/A 12/16/2020    Procedure: upper endoscopy, WITH BIOPSY;  Surgeon: Marily Lane MD;  [facility]: [facility]     ESOPHAGOSCOPY, GASTROSCOPY, DUODENOSCOPY (EGD), COMBINED N/A 07/13/2021    Procedure: ESOPHAGOGASTRODUODENOSCOPY, WITH BIOPSY;  Surgeon: Marily Lane MD;  [facility]: [facility]     ESOPHAGOSCOPY, GASTROSCOPY, DUODENOSCOPY (EGD), COMBINED N/A 08/01/2022    Procedure: ESOPHAGOGASTRODUODENOSCOPY, WITH BIOPSY;  Surgeon: Marily Lane  MD;  Location:  [facility]S SED[facility]     ORTHOPEDIC SURGERY  2010    Trigger thumb    [place] HAND/FINGER SURGERY UNLISTED  2013    Trigger thumb    TONSILLECTOMY, ADENOIDECTOMY, COMBINED Bilateral 12/19/2019    Procedure: Bilateral TONSILLECTOMY, possible adenoidectomy;  Surgeon: Markus Rojas MD;  Location:  OR      Social History     Tobacco Use    Smoking status: Never     Passive exposure: Never    Smokeless tobacco: Never   Substance Use Topics    Alcohol use: No      Problem (# of Occurrences) Relation (Name,Age of Onset)    Eye Disorder (1) Mother (Mai)    Hypertension (2) Maternal Grandfather, Paternal Grandmother (Shelby)    Breast Cancer (1) Maternal Grandmother (Minerva)    Other Cancer (1) Father (Gustavo): Brain    Hyperlipidemia (1) Maternal Grandmother (Minerva)    Crohn's Disease (1) Other (Dad's side of family)    Ulcerative Colitis (1) Other (Dad's side of family)    Colon Polyps (1) Other (Dad's side of family)    Colon Cancer (1) Other (Dad's side of family)    Brain Cancer (1) Father (Gustavo)              Current Outpatient Medications   Medication Sig Dispense Refill    acetaminophen (TYLENOL) 325 MG tablet Take 2 tablets (650 mg) 30 minutes before injecting Humira. 12 tablet 1    BANOPHEN 25 MG capsule TAKE 1 CAPSULE BY MOUTH 30 MINUTES BEFORE INJECTING HUMIRA      COMPOUNDED NON-CONTROLLED SUBSTANCE (CMPD RX) - PHARMACY TO MIX COMPOUNDED MEDICATION Apply 20 mLs into each nare 2 times daily Gentamicin/Dexamethasone 160/120mg/liter saline 1000 mL 12    cyanocobalamin (CYANOCOBALAMIN) 1000 mcg/mL injection Inject 1,000 mcg into the muscle every 30 days.      dicyclomine (BENTYL) 10 MG capsule Take 1 capsule (10 mg) by mouth 4 times daily as needed (abdominal pain). Slowly wean off as directed. 120 capsule 3    diphenhydrAMINE (BENADRYL) 25 MG tablet Take 1 tablet (25 mg) 30 minutes before injecting Humira 25 tablet 1    dupilumab (DUPIXENT) 300 MG/2ML prefilled pen Inject 300 mg subcutaneously.    "   Emergency Supply Kit, PIV, Patient use for emergency only. Contents: 3 sodium chloride 0.9% flushes, 1 IV start kit, 1 microclave ext set 14\", 1 each IV Cath 22 G/1\" and 24G/3/4\", 6 alcohol prep pads, 4 nitrile gloves (med). Call [phone] to reorder. [id number] kit 0    EPINEPHrine (ANY BX GENERIC EQUIV) 0.3 MG/0.3ML injection 2-pack Inject 0.3 mLs (0.3 mg) into the muscle as needed for anaphylaxis. May repeat one time in 5-15 minutes if response to initial dose is inadequate. 2 each 0    famotidine (PEPCID) 20 MG tablet Take 1 tablet (20 mg) by mouth At Bedtime 30 tablet 3    fluocinonide (LIDEX) 0.05 % external ointment Apply topically 2 times daily Apply as directed 60 g 0    gabapentin (NEURONTIN) 100 MG capsule TAKE 1 CAPSULE BY MOUTH AT BEDTIME X5DAYS THEN 1 CAPSULE TWICE DAILY X5DAYS THEN 1 CAPSULE THREE TIMES DAILY      gentamicin (GARAMYCIN) 0.1 % external ointment Apply topically 2 times daily Mixed with lidex ointment to areas of rash 30 g 0    HUMIRA, 2 PEN, 40 MG/0.4ML pen kit Inject 0.4 mLs (40 mg) subcutaneously every 14 days. 0.8 mL 5    hydrOXYzine HCl (ATARAX) 10 MG tablet Take 2 tablets (20 mg) by mouth at bedtime 180 tablet 2    mometasone (ELOCON) 0.1 % external ointment Apply twice daily for 2 weeks for sun rash on chest and arms and ears 45 g 3    montelukast (SINGULAIR) 10 MG tablet Take 1 tablet by mouth at bedtime.      mupirocin (BACTROBAN) 2 % external ointment Apply topically 3 times daily. 30 g 0    norethindrone-ethinyl estradiol (MICROGESTIN 1.5/30) 1.5-30 MG-MCG tablet Take 1 tablet by mouth daily. . Active tabs only, skip placebo pills. Take continuously. 84 tablet 4    ondansetron (ZOFRAN ODT) 4 MG ODT tab Take 1 tablet (4 mg) by mouth every 8 hours as needed for nausea 12 tablet 3    ondansetron (ZOFRAN ODT) 8 MG ODT tab Take 1 tablet (8 mg) by mouth every 8 hours as needed for nausea. 15 tablet 1    pantoprazole (PROTONIX) 40 MG EC tablet Take 1 tablet (40 mg) by mouth " "daily. 90 tablet 0    pimecrolimus (ELIDEL) 1 % external cream APPLY 1 APPLICATION TOPICALLY TWICE DAILY. APPLY TO AFFECTED AREAS THAT HAVE DERMATITIS      senna (SENNA LAX) 8.6 MG tablet Take 1 tablet by mouth daily 30 tablet 5    sodium chloride 0.9 % in 1,000 mL via gravity infusion Infuse over 2-4 hours into the vein once a week. Infuse 1 bag(s) (1000 mL). Each bag to infuse over 2-4 hours via gravity 867530 mL 0    sodium chloride, PF, 0.9% PF flush Inject 10 mLs into the vein as needed for line flush. Flush IV before and after medication administration as directed and/or at least every 12 hours. 278661 mL 0    tacrolimus (PROTOPIC) 0.1 % external ointment Apply topically.      VTAMA 1 % CREA Apply 1 Application topically daily as needed Stop for pregnancy 60 g 11     No current facility-administered medications for this visit.     Allergies   Allergen Reactions    Gluten Meal Anaphylaxis     Throat gets sores and swelling    Other reaction(s): GI Upset   Sensitivity, avoiding    Sensitivity, avoiding      Other reaction(s): GI Upset Sensitivity, avoiding    Infliximab Anaphylaxis     Pt will still be getting it. Give pre-meds and give over 2 hours      Other Drug Allergy (See Comments) GI Disturbance     Sorbitol-Mannitol-Xanthan Gum - Patient experienced nausea, dizziness, and felt all over body \"tingling\" - discontinued Breeza and continued with water  2/12/2025    Tamiflu [Oseltamivir] Rash         OBJECTIVE:     No vitals were obtained today due to virtual telephone visit.    Physical Exam  GENERAL: alert and no distress  EYES: Eyes grossly normal to inspection.  No discharge or erythema, or obvious scleral/conjunctival abnormalities.  RESP: No audible wheeze, cough, or visible cyanosis.    SKIN: Visible skin clear. No significant rash, abnormal pigmentation or lesions.  NEURO: Cranial nerves grossly intact.  Mentation and speech appropriate for age.  PSYCH: Appropriate affect, tone, and pace of " words          ASSESSMENT/PLAN:                                                      Phone call duration: 8 minutes      ICD-10-CM    1. Pelvic pain  R10.2 MR Endometriosis w/o & w Contrast     Physical Therapy  Referral          There are no Patient Instructions on file for this visit.    21 yo female with debilitating pelvic pain at this time.   Uterus is normal on US. Thin EMS.   Neither ovary was visualized. Recommended pelvic MRI.   PFPT referral sent for her.     ASHLIE Renee CNP  [facility] [place]

## 2025-07-18 ENCOUNTER — ANCILLARY PROCEDURE (OUTPATIENT)
Dept: ULTRASOUND IMAGING | Facility: CLINIC | Age: 20
End: 2025-07-18
Attending: NURSE PRACTITIONER
Payer: COMMERCIAL

## 2025-07-18 DIAGNOSIS — R10.2 PELVIC PAIN: ICD-10-CM

## 2025-07-18 PROCEDURE — 76856 US EXAM PELVIC COMPLETE: CPT | Performed by: OBSTETRICS & GYNECOLOGY

## 2025-07-21 ENCOUNTER — HOME INFUSION BILLING (OUTPATIENT)
Dept: HOME HEALTH SERVICES | Facility: HOME HEALTH | Age: 20
End: 2025-07-21
Payer: COMMERCIAL

## 2025-07-21 ENCOUNTER — HOME CARE VISIT (OUTPATIENT)
Dept: HOME HEALTH SERVICES | Facility: HOME HEALTH | Age: 20
End: 2025-07-21
Payer: COMMERCIAL

## 2025-07-21 VITALS
OXYGEN SATURATION: 100 % | HEART RATE: 108 BPM | WEIGHT: 117.8 LBS | DIASTOLIC BLOOD PRESSURE: 74 MMHG | SYSTOLIC BLOOD PRESSURE: 116 MMHG | TEMPERATURE: 98.4 F | RESPIRATION RATE: 18 BRPM | BODY MASS INDEX: 21.55 KG/M2

## 2025-07-21 PROCEDURE — 99601 HOME NFS VISIT <2 HRS: CPT

## 2025-07-21 PROCEDURE — S1015 IV TUBING EXTENSION SET: HCPCS

## 2025-07-21 PROCEDURE — S9374 HIT HYDRA 1 LITER DIEM: HCPCS

## 2025-07-21 NOTE — PROGRESS NOTES
US PIV start for IVF. Patient feeling OK today.  Ultrasound IV start for IV fluids. Patient will discontinue IV when IV fluids are complete.   Patient waiting to hear back from two other doctors whether patient will get a port. Her doctors and test patient needing IV fluids twice a week. And iron and Vit B. No orders yet, in discussion.  Patient hasn t had IV fluids for a few weeks and states can tell the difference.   IV starts are starting to get painful due to scarring from multiple IV pokes.

## 2025-07-22 ENCOUNTER — VIRTUAL VISIT (OUTPATIENT)
Dept: OBGYN | Facility: CLINIC | Age: 20
End: 2025-07-22
Attending: NURSE PRACTITIONER
Payer: COMMERCIAL

## 2025-07-22 DIAGNOSIS — R10.2 PELVIC PAIN: Primary | ICD-10-CM

## 2025-07-22 PROCEDURE — 99207 PR NO BILLABLE SERVICE THIS VISIT: CPT | Performed by: NURSE PRACTITIONER

## 2025-07-28 PROCEDURE — S9374 HIT HYDRA 1 LITER DIEM: HCPCS

## 2025-07-29 ENCOUNTER — OFFICE VISIT (OUTPATIENT)
Dept: URGENT CARE | Facility: URGENT CARE | Age: 20
End: 2025-07-29
Payer: COMMERCIAL

## 2025-07-29 ENCOUNTER — VIRTUAL VISIT (OUTPATIENT)
Dept: INTERNAL MEDICINE | Facility: CLINIC | Age: 20
End: 2025-07-29
Payer: COMMERCIAL

## 2025-07-29 ENCOUNTER — HOME CARE VISIT (OUTPATIENT)
Dept: HOME HEALTH SERVICES | Facility: HOME HEALTH | Age: 20
End: 2025-07-29
Payer: COMMERCIAL

## 2025-07-29 VITALS
RESPIRATION RATE: 16 BRPM | DIASTOLIC BLOOD PRESSURE: 91 MMHG | TEMPERATURE: 97.8 F | HEART RATE: 104 BPM | OXYGEN SATURATION: 100 % | SYSTOLIC BLOOD PRESSURE: 136 MMHG | HEIGHT: 62 IN | BODY MASS INDEX: 21.55 KG/M2

## 2025-07-29 VITALS
TEMPERATURE: 98 F | RESPIRATION RATE: 16 BRPM | SYSTOLIC BLOOD PRESSURE: 114 MMHG | DIASTOLIC BLOOD PRESSURE: 69 MMHG | HEART RATE: 78 BPM | OXYGEN SATURATION: 99 %

## 2025-07-29 DIAGNOSIS — J02.9 SORE THROAT: Primary | ICD-10-CM

## 2025-07-29 DIAGNOSIS — D51.0 PERNICIOUS ANEMIA: Primary | ICD-10-CM

## 2025-07-29 DIAGNOSIS — E61.1 IRON DEFICIENCY: ICD-10-CM

## 2025-07-29 DIAGNOSIS — K50.10 CROHN'S DISEASE OF COLON WITHOUT COMPLICATION (H): ICD-10-CM

## 2025-07-29 LAB
DEPRECATED S PYO AG THROAT QL EIA: NEGATIVE
DEPRECATED S PYO AG THROAT QL EIA: NEGATIVE

## 2025-07-29 PROCEDURE — 1125F AMNT PAIN NOTED PAIN PRSNT: CPT | Performed by: INTERNAL MEDICINE

## 2025-07-29 PROCEDURE — 1126F AMNT PAIN NOTED NONE PRSNT: CPT | Performed by: NURSE PRACTITIONER

## 2025-07-29 PROCEDURE — 3075F SYST BP GE 130 - 139MM HG: CPT | Performed by: INTERNAL MEDICINE

## 2025-07-29 PROCEDURE — 99601 HOME NFS VISIT <2 HRS: CPT

## 2025-07-29 PROCEDURE — 99213 OFFICE O/P EST LOW 20 MIN: CPT | Performed by: INTERNAL MEDICINE

## 2025-07-29 PROCEDURE — 87651 STREP A DNA AMP PROBE: CPT | Performed by: INTERNAL MEDICINE

## 2025-07-29 PROCEDURE — 3080F DIAST BP >= 90 MM HG: CPT | Performed by: INTERNAL MEDICINE

## 2025-07-29 PROCEDURE — 98007 SYNCH AUDIO-VIDEO EST HI 40: CPT | Performed by: NURSE PRACTITIONER

## 2025-07-29 RX ORDER — DIPHENHYDRAMINE HYDROCHLORIDE 50 MG/ML
25 INJECTION, SOLUTION INTRAMUSCULAR; INTRAVENOUS
Start: 2025-08-05

## 2025-07-29 RX ORDER — DIPHENHYDRAMINE HYDROCHLORIDE 50 MG/ML
50 INJECTION, SOLUTION INTRAMUSCULAR; INTRAVENOUS
Start: 2025-08-05

## 2025-07-29 RX ORDER — MEPERIDINE HYDROCHLORIDE 25 MG/ML
25 INJECTION INTRAMUSCULAR; INTRAVENOUS; SUBCUTANEOUS
OUTPATIENT
Start: 2025-08-05

## 2025-07-29 RX ORDER — CYANOCOBALAMIN 1000 UG/ML
1000 INJECTION, SOLUTION INTRAMUSCULAR; SUBCUTANEOUS
Qty: 1 ML | Refills: 11 | Status: SHIPPED | OUTPATIENT
Start: 2025-07-29

## 2025-07-29 RX ORDER — HEPARIN SODIUM,PORCINE 10 UNIT/ML
5-20 VIAL (ML) INTRAVENOUS DAILY PRN
OUTPATIENT
Start: 2025-08-05

## 2025-07-29 RX ORDER — METHYLPREDNISOLONE SODIUM SUCCINATE 40 MG/ML
40 INJECTION INTRAMUSCULAR; INTRAVENOUS
Start: 2025-08-05

## 2025-07-29 RX ORDER — EPINEPHRINE 1 MG/ML
0.3 INJECTION, SOLUTION, CONCENTRATE INTRAVENOUS EVERY 5 MIN PRN
OUTPATIENT
Start: 2025-08-05

## 2025-07-29 RX ORDER — HEPARIN SODIUM (PORCINE) LOCK FLUSH IV SOLN 100 UNIT/ML 100 UNIT/ML
5 SOLUTION INTRAVENOUS
OUTPATIENT
Start: 2025-08-05

## 2025-07-29 RX ORDER — ALBUTEROL SULFATE 90 UG/1
1-2 INHALANT RESPIRATORY (INHALATION)
Start: 2025-08-05

## 2025-07-29 RX ORDER — ALBUTEROL SULFATE 0.83 MG/ML
2.5 SOLUTION RESPIRATORY (INHALATION)
OUTPATIENT
Start: 2025-08-05

## 2025-07-29 ASSESSMENT — PAIN SCALES - GENERAL: PAINLEVEL_OUTOF10: MODERATE PAIN (5)

## 2025-07-29 NOTE — PROGRESS NOTES
SUBJECTIVE:  Jaimie Ortiz is an 20 year old female who presents for nausea and sore throat.  For two days.  Throat is more red now than was initially.  Some white spots.  No fevers.  No v/d.  No skin rashes.  No known exposures.  Has had strep recur even after tonsillectomy    PMH:   has a past medical history of Allergy to mold spores, Anxiety (4/18), Crohn's disease (H), Diagnostic skin and sensitization tests (12/9/13 skin tests pos. only for M/W only), HSP (Henoch Schonlein purpura) (12/2007), Other and unspecified noninfectious gastroenteritis and colitis(558.9) (05/20/2009), and Seasonal allergic rhinitis.  Patient Active Problem List   Diagnosis    Polyp of maxillary sinus    Allergy to mold spores    Seasonal allergic rhinitis    Diagnostic skin and sensitization tests(aka ALLERGENS)    Neck pain    Polymorphous light eruption    Autoeczematization    Mixed obsessional thoughts and acts    Chronic tonsillitis    Weakness of both lower extremities    Abdominal pain, generalized    Diarrhea, unspecified type    Esophagitis determined by biopsy    Crohn's disease of colon without complication (H)    Gilbert's syndrome    Irritable bowel syndrome with both constipation and diarrhea    Gastritis with hemorrhage, unspecified chronicity, unspecified gastritis type    Crohn's colitis (H)    Chronic idiopathic constipation    Post-operative complication    Pain in joint    Constipation    Pernicious anemia    Iron deficiency     Social History     Socioeconomic History    Marital status: Single     Spouse name: None    Number of children: None    Years of education: None    Highest education level: None   Tobacco Use    Smoking status: Never     Passive exposure: Never    Smokeless tobacco: Never   Vaping Use    Vaping status: Never Used   Substance and Sexual Activity    Alcohol use: No    Drug use: No    Sexual activity: Never     Social Drivers of Health     Financial Resource Strain: Low Risk  (2/11/2025)     Financial Resource Strain     Within the past 12 months, have you or your family members you live with been unable to get utilities (heat, electricity) when it was really needed?: No   Food Insecurity: No Food Insecurity (6/13/2025)    Received from [facility]    Hunger Vital Sign     Worried About Running Out of Food in the Last Year: Never true     Ran Out of Food in the Last Year: Never true   Transportation Needs: No Transportation Needs (6/13/2025)    Received from [facility]    PRAPARE - Transportation     Lack of Transportation (Medical): No     Lack of Transportation (Non-Medical): No   Physical Activity: Insufficiently Active (6/13/2025)    Received from [facility]    Exercise Vital Sign     Days of Exercise per Week: 5 days     Minutes of Exercise per Session: 10 min   Interpersonal Safety: Low Risk  (6/5/2025)    Interpersonal Safety     Do you feel physically and emotionally safe where you currently live?: Yes     Within the past 12 months, have you been hit, slapped, kicked or otherwise physically hurt by someone?: No     Within the past 12 months, have you been humiliated or emotionally abused in other ways by your partner or ex-partner?: No   Housing Stability: Low Risk  (6/13/2025)    Received from [facility]    Housing Stability     What is your living situation today?: I have a steady place to live     Family History   Problem Relation Age of Onset    Eye Disorder Mother     Brain Cancer Father     Other Cancer Father         Brain    Breast Cancer Maternal Grandmother     Hyperlipidemia Maternal Grandmother     Hypertension Maternal Grandfather     Hypertension Paternal Grandmother     Crohn's Disease Other     Ulcerative Colitis Other     Colon Polyps Other     Colon Cancer Other        ALLERGIES:  Gluten meal, Infliximab, Other drug allergy (see comments), and Tamiflu [oseltamivir]    Current Outpatient Medications   Medication Sig Dispense Refill    acetaminophen (TYLENOL) 325 MG tablet  "Take 2 tablets (650 mg) 30 minutes before injecting Humira. 12 tablet 1    BANOPHEN 25 MG capsule TAKE 1 CAPSULE BY MOUTH 30 MINUTES BEFORE INJECTING HUMIRA      COMPOUNDED NON-CONTROLLED SUBSTANCE (CMPD RX) - PHARMACY TO MIX COMPOUNDED MEDICATION Apply 20 mLs into each nare 2 times daily Gentamicin/Dexamethasone 160/120mg/liter saline 1000 mL 12    cyanocobalamin (CYANOCOBALAMIN) 1000 mcg/mL injection Inject 1 mL (1,000 mcg) into the muscle every 30 days. 1 mL 11    cyanocobalamin (CYANOCOBALAMIN) 1000 mcg/mL injection Inject 1,000 mcg into the muscle every 30 days.      dicyclomine (BENTYL) 10 MG capsule Take 1 capsule (10 mg) by mouth 4 times daily as needed (abdominal pain). Slowly wean off as directed. 120 capsule 3    diphenhydrAMINE (BENADRYL) 25 MG tablet Take 1 tablet (25 mg) 30 minutes before injecting Humira 25 tablet 1    dupilumab (DUPIXENT) 300 MG/2ML prefilled pen Inject 300 mg subcutaneously.      Emergency Supply Kit, PIV, Patient use for emergency only. Contents: 3 sodium chloride 0.9% flushes, 1 IV start kit, 1 microclave ext set 14\", 1 each IV Cath 22 G/1\" and 24G/3/4\", 6 alcohol prep pads, 4 nitrile gloves (med). Call [phone] to reorder. [id number] kit 0    EPINEPHrine (ANY BX GENERIC EQUIV) 0.3 MG/0.3ML injection 2-pack Inject 0.3 mLs (0.3 mg) into the muscle as needed for anaphylaxis. May repeat one time in 5-15 minutes if response to initial dose is inadequate. 2 each 0    famotidine (PEPCID) 20 MG tablet Take 1 tablet (20 mg) by mouth At Bedtime 30 tablet 3    fluocinonide (LIDEX) 0.05 % external ointment Apply topically 2 times daily Apply as directed 60 g 0    gabapentin (NEURONTIN) 100 MG capsule TAKE 1 CAPSULE BY MOUTH AT BEDTIME X5DAYS THEN 1 CAPSULE TWICE DAILY X5DAYS THEN 1 CAPSULE THREE TIMES DAILY      gentamicin (GARAMYCIN) 0.1 % external ointment Apply topically 2 times daily Mixed with lidex ointment to areas of rash 30 g 0    HUMIRA, 2 PEN, 40 MG/0.4ML pen kit Inject 0.4 mLs " "(40 mg) subcutaneously every 14 days. 0.8 mL 5    hydrOXYzine HCl (ATARAX) 10 MG tablet Take 2 tablets (20 mg) by mouth at bedtime 180 tablet 2    mometasone (ELOCON) 0.1 % external ointment Apply twice daily for 2 weeks for sun rash on chest and arms and ears 45 g 3    montelukast (SINGULAIR) 10 MG tablet Take 1 tablet by mouth at bedtime.      mupirocin (BACTROBAN) 2 % external ointment Apply topically 3 times daily. 30 g 0    Needle, Disp, 22G X 1\" MISC Use for B12 injections. 50 each 1    norethindrone-ethinyl estradiol (MICROGESTIN 1.5/30) 1.5-30 MG-MCG tablet Take 1 tablet by mouth daily. . Active tabs only, skip placebo pills. Take continuously. 84 tablet 4    ondansetron (ZOFRAN ODT) 4 MG ODT tab Take 1 tablet (4 mg) by mouth every 8 hours as needed for nausea 12 tablet 3    ondansetron (ZOFRAN ODT) 8 MG ODT tab Take 1 tablet (8 mg) by mouth every 8 hours as needed for nausea. 15 tablet 1    pantoprazole (PROTONIX) 40 MG EC tablet Take 1 tablet (40 mg) by mouth daily. 90 tablet 0    pimecrolimus (ELIDEL) 1 % external cream APPLY 1 APPLICATION TOPICALLY TWICE DAILY. APPLY TO AFFECTED AREAS THAT HAVE DERMATITIS      senna (SENNA LAX) 8.6 MG tablet Take 1 tablet by mouth daily 30 tablet 5    sodium chloride 0.9 % in 1,000 mL via gravity infusion Infuse over 2-4 hours into the vein once a week. Infuse 1 bag(s) (1000 mL). Each bag to infuse over 2-4 hours via gravity 684390 mL 0    sodium chloride, PF, 0.9% PF flush Inject 10 mLs into the vein as needed for line flush. Flush IV before and after medication administration as directed and/or at least every 12 hours. 346275 mL 0    tacrolimus (PROTOPIC) 0.1 % external ointment Apply topically.      VTAMA 1 % CREA Apply 1 Application topically daily as needed Stop for pregnancy 60 g 11     No current facility-administered medications for this visit.         ROS:  ROS is done and is negative for general/constitutional, eye, ENT, Respiratory, cardiovascular, GI, , " "Skin, musculoskeletal except as noted elsewhere.  All other review of systems negative except as noted elsewhere.      OBJECTIVE:  BP (!) 136/91   Pulse 104   Temp 97.8  F (36.6  C) (Tympanic)   Resp 16   Ht 1.575 m (5' 2\")   SpO2 100%   BMI 21.55 kg/m    GENERAL APPEARANCE: Alert, in no acute distress  EYES: normal  EARS: External ears normal. Canals clear. TM's normal.  NOSE:mild clear discharge  OROPHARYNX:mild erythema and no tonsillar hypertrophy  NECK:No adenopathy,masses or thyromegaly  RESP: normal and clear to auscultation  CV:regular rate and rhythm and no murmurs, clicks, or gallops  ABDOMEN: Abdomen soft, non-tender. BS normal. No masses, organomegaly  SKIN: no ulcers, lesions or rash  MUSCULOSKELETAL:Musculoskeletal normal      RESULTS  Recent Results (from the past 48 hours)   Streptococcus A Rapid Screen w/Reflex to PCR - Clinic Collect    Specimen: Throat; Swab   Result Value Ref Range    Group A Strep antigen Negative Negative   Streptococcus A Rapid Screen w/Reflex to PCR - Clinic Collect    Specimen: Throat; Swab   Result Value Ref Range    Group A Strep antigen Negative Negative   .  No results found for this or any previous visit (from the past 48 hours).    ASSESSMENT/PLAN:    ASSESSMENT / PLAN:  (J02.9) Sore throat  (primary encounter diagnosis)  Comment: rapid strep test is negative, pt requested a repeat as she thought the first swab didn't get her throat well, but repeat also negative.  Currently c/with viral etiology  Plan: Streptococcus A Rapid Screen w/Reflex to PCR -         Clinic Collect, Streptococcus A Rapid Screen         w/Reflex to PCR - Clinic Collect, Group A         Streptococcus PCR Throat Swab, CANCELED: Group         A Streptococcus PCR Throat Swab        Await strep PCR and treat if positive.  I reviewed supportive care, otc meds to use if needed, expected course, and signs of concern.  Any lab and imaging results available at visit were reviewed by me.  Follow up as " needed or if does not improve within 5 day(s) or if worsens in any way.  Reviewed red flag symptoms and is to go to the ER if experiences any of these.    See [facility] for orders, medications, letters, patient instructions    Sunshine Laguerre M.D.

## 2025-07-29 NOTE — PROGRESS NOTES
"Jaimie is a 20 year old who is being evaluated via a billable video visit.    How would you like to obtain your AVS? MyChart  If the video visit is dropped, the invitation should be resent by: Send to e-mail at: [email or web address]  Will anyone else be joining your video visit? No        Are refills needed on medications prescribed by this physician? NO    Maggie Dowell VVF    Assessment & Plan     Pernicious anemia  Low B12 levels in April.  Will start B12 injections monthly.  - cyanocobalamin (CYANOCOBALAMIN) 1000 mcg/mL injection; Inject 1 mL (1,000 mcg) into the muscle every 30 days.  - Needle, Disp, 22G X 1\" MISC; Use for B12 injections.    Iron deficiency  Crohn's disease of colon without complication (H)  Ferritin 8; may be contributing to her longstanding dizziness.  Reviewed notes from [facility], agree IV iron may help manage symptoms.  Will order Venofer 200 mg x5 doses.    She has upcoming appointment with [facility]ascular team at [facility] to discuss possible central line--per chart review, not indicated from a GI or nutrition perspective, but does continue with weekly fluid boluses.    Follow-up  Return in about 6 months (around 1/29/2026).      41 minutes spent by me on the date of the encounter doing chart review, history and exam, documentation and further activities per the note      Subjective   Jaimie is a 20 year old, presenting for the following health issues:  RECHECK and Discuss [facility] recommendations    History of Present Illness       Reason for visit:  Discuss suggestions from [facility]  Symptom onset:  More than a month  Symptoms include:  NONE  Symptom intensity:  Moderate  Symptom progression:  Staying the same  Had these symptoms before:  No  What makes it worse:  NO  What makes it better:  NO   She is taking medications regularly.        Crohn's--continues on Humira, switching to Entyvio; was concerned that the atopic dermatiits was worsening d/t humira and wasn't working as " "well, so decided to [facility].    Pernicious anemia--  Iron, infusion   Ferritin 8, Hgb 12.2, high platelets for past 2.5 years.    [facility]--tilt table test was negative, no evidence of autonomic  failure. Has baseline tachycardia with symptoms of orthostatic intolerance.  Anti-parietal cell antibodies low positive at 27, though EGD biopsies did not show gastritis.  Hx constipation, do not expect her to be able to tolerate oral iron supplements.  24-hr urine metanephrines.  Inadequate oral fluid intake thought to be contributing to resting tachycardia, orthostatic intolerance and low 24-hr urine volume.  Met with nutrition, does not meet criteria for malnutrition.   Continues on 1 L fluid boluses once a week for orthostatic intolerance and dehydration--helps with dizziness and muscle pain; meeting with vascular med on 8/4 to discuss port placement risk vs benefit  Started Dupixent this week.   Working with ENT, considering repeat adenoidectomy.  Saw Gyn, scheduled for pelvic MRI, possible endometriosis.        Review of Systems  Constitutional, HEENT, cardiovascular, pulmonary, gi and gu systems are negative, except as otherwise noted.      Objective    Vitals - Patient Reported  Weight (Patient Reported): 53.5 kg (118 lb)  Height (Patient Reported): 157.5 cm (5' 2\")  BMI (Based on Pt Reported Ht/Wt): 21.58  Pain Score: No Pain (0)        Physical Exam   GENERAL: alert and no distress  EYES: Eyes grossly normal to inspection.  No discharge or erythema, or obvious scleral/conjunctival abnormalities.  RESP: No audible wheeze, cough, or visible cyanosis.    SKIN: Visible skin clear. No significant rash, abnormal pigmentation or lesions.  NEURO: Cranial nerves grossly intact.  Mentation and speech appropriate for age.  PSYCH: Appropriate affect, tone, and pace of words          Video-Visit Details    Type of service:  Video Visit   Start time: 8:35 AM  End time: 8:51 AM  Originating Location (pt. Location): " Home    Distant Location (provider location):  Off-site  Platform used for Video Visit: Preethi  Signed Electronically by: ASHLIE Jama CNP

## 2025-07-29 NOTE — PATIENT INSTRUCTIONS
Thank you for visiting the Primary Care Center today at the [facility]! The following is some information about our clinic:     Primary Care Center Frequently-Asked Questions    (1) How do I schedule appointments at the [facility]?     Primary Care--to schedule or make changes to an existing appointment, please call our primary care line at [phone].    Labs--to schedule a lab appointment at the [facility] you can use pushd or call [phone]. If you have a [place] location that is closer to home, you can reach out to that location for scheduling options.     Imaging--if you need to schedule a CT, X-ray, MRI, ultrasound, or other imaging study you can call [phone] to schedule at the [facility] or any other [facility] imaging location.     Referrals--if a referral to another specialty was ordered you can expect a phone call from their scheduling team. If you have not heard from them in a week, please call us or send us a pushd message to check the status or get a scheduling number. Please note that this only applies to internal [facility] referrals. If the referral is external you would need to contact their office for scheduling.     (2) I have a question about my visit, who do I contact?     You can call us at the primary care line at [phone] to ask questions about your visit. You can also send a secure message through pushd, which is reviewed by clinic staff. Please note that pushd messages have a twenty-four to forty-eight business hour turnaround time and should not be used for urgent concerns.    (3) How will I get the results of my tests?    If you are signed up for [employer] all tests will be released to you within twenty-four hours of resulting. Please allow three to five days for your doctor to review your results and place a note interpreting the results. If you do not have [employer] you will receive your  results through mail seven to ten business days following the return of the tests. Please note that if there should be any urgent or concerning results that your doctor or their registered nurse will reach out to you the same day as the tests come back. If you have follow up questions about your results or would like to discuss the results in detail please schedule a follow up with your provider either in person or virtually.     (4) How do I get refills of my prescriptions?     You should always first contact your pharmacy for refills of your medications. If submitting a refill request on [employer], please be sure to submit the request only once--repeat requests can cause delays in refill. If you are requesting a NEW medication or a medication related to new symptoms you will need to schedule an appointment with a provider prior to approval. Please note: Routine medication refills have up to one to three business day turnaround whereas controlled substances refills have up to five to seven business day turnaround.    (5) I have new symptoms, what do I do?     If you are having an immediate medical emergency, you should dial 911 for assistance.   For anything urgent that needs to be seen within a few hours to one day you should visit a local urgent care for assistance.  For non-urgent symptoms that need to be seen within a few days to a week you can schedule with an available provider in primary care by going to [email or web address] or calling [phone].   If you are not sure how serious your symptoms are or you would like to receive medical advice you can always call [phone] to speak with a triage nurse.

## 2025-07-29 NOTE — PROGRESS NOTES
Urgent Care Clinic Visit    Chief Complaint   Patient presents with    Urgent Care    Pharyngitis     Sore throat x's 2 days with nausea no fever or cough               7/29/2025     5:15 PM   Additional Questions   Roomed by ca   Accompanied by self     Does the patient have a sore throat and either history of fever >100.4 in the previous 24 hours or recent exposure to a known case of strep throat? No

## 2025-07-30 ENCOUNTER — HOME CARE VISIT (OUTPATIENT)
Dept: HOME HEALTH SERVICES | Facility: HOME HEALTH | Age: 20
End: 2025-07-30
Payer: COMMERCIAL

## 2025-07-30 VITALS
SYSTOLIC BLOOD PRESSURE: 116 MMHG | RESPIRATION RATE: 16 BRPM | HEART RATE: 81 BPM | TEMPERATURE: 98 F | OXYGEN SATURATION: 96 % | DIASTOLIC BLOOD PRESSURE: 70 MMHG

## 2025-07-30 LAB — S PYO DNA THROAT QL NAA+PROBE: NOT DETECTED

## 2025-07-30 PROCEDURE — 99601 HOME NFS VISIT <2 HRS: CPT

## 2025-07-30 NOTE — PROGRESS NOTES
Nursing Visit Note:  Nurse visit today for PIV placement for Jaimie Ortiz.     present during visit today: Not Applicable.    Intravenous Access:  Peripheral IV placed.    N/A    Note: VSS, patient reports feeling nauseated for two days and unable to take in enough fluids. She feels the heat and humidity has caused this. PIV placed without difficulty, patient tolerated well. Patient denies having any other concerns to address with today s visit.    Saline administered: 10mLs (ml)    Supply Check:   Does the patient have all the supplies they need for the next visit?  Yes    Next visit plan: Tentative for 7/30/29    Kari Briggs, RN 7/29/2025

## 2025-07-31 NOTE — PROGRESS NOTES
Nursing Visit Note:  Nurse visit today for PIV start for Jaimie Ortiz.     present during visit today: Not Applicable.    Intravenous Access:  Peripheral IV placed.      Note: patient alert and oriented. patient states having a rough few days. Complains of 6/10 abdominal pain. continues to have intermittent palpitations. Also having some nausea today and has had little PO intake. Will be doing Humira injection later today. VSS. Patient states will be seeing vascular surgeon next week to discuss getting a port possibly. new PIV started today due to patient report of  unable to get blood return and noticed air in line  so she removed IV. Education provided that patient does not need to be checking for blood return prior to infusion. New PIV started. Infusing by end of visit.    Saline administered: 10 (ml)    Supply Check:   Does the patient have all the supplies they need for the next visit?  No, Order placed for patient states will call to request next week.    Next visit plan: 8/6/25. Patient states may call to reschedule.    Neymar Bledsoe RN 7/30/2025

## 2025-08-03 ENCOUNTER — HEALTH MAINTENANCE LETTER (OUTPATIENT)
Age: 20
End: 2025-08-03

## 2025-08-04 PROCEDURE — S9374 HIT HYDRA 1 LITER DIEM: HCPCS

## 2025-08-06 ENCOUNTER — HOME INFUSION (OUTPATIENT)
Dept: HOME HEALTH SERVICES | Facility: HOME HEALTH | Age: 20
End: 2025-08-06

## 2025-08-06 ENCOUNTER — HOME CARE VISIT (OUTPATIENT)
Dept: HOME HEALTH SERVICES | Facility: HOME HEALTH | Age: 20
End: 2025-08-06
Payer: COMMERCIAL

## 2025-08-06 VITALS
OXYGEN SATURATION: 97 % | TEMPERATURE: 98 F | HEART RATE: 99 BPM | RESPIRATION RATE: 16 BRPM | DIASTOLIC BLOOD PRESSURE: 80 MMHG | SYSTOLIC BLOOD PRESSURE: 110 MMHG

## 2025-08-06 PROCEDURE — 99601 HOME NFS VISIT <2 HRS: CPT

## 2025-08-07 ENCOUNTER — HOME INFUSION BILLING (OUTPATIENT)
Dept: HOME HEALTH SERVICES | Facility: HOME HEALTH | Age: 20
End: 2025-08-07
Payer: COMMERCIAL

## 2025-08-07 DIAGNOSIS — K50.10 CROHN'S DISEASE OF COLON WITHOUT COMPLICATION (H): ICD-10-CM

## 2025-08-07 PROCEDURE — S9374 HIT HYDRA 1 LITER DIEM: HCPCS

## 2025-08-07 PROCEDURE — A4245 ALCOHOL WIPES PER BOX: HCPCS

## 2025-08-07 PROCEDURE — S1015 IV TUBING EXTENSION SET: HCPCS

## 2025-08-11 ENCOUNTER — OFFICE VISIT (OUTPATIENT)
Dept: URGENT CARE | Facility: URGENT CARE | Age: 20
End: 2025-08-11
Payer: COMMERCIAL

## 2025-08-11 ENCOUNTER — NURSE TRIAGE (OUTPATIENT)
Dept: INTERNAL MEDICINE | Facility: CLINIC | Age: 20
End: 2025-08-11
Payer: COMMERCIAL

## 2025-08-11 VITALS
SYSTOLIC BLOOD PRESSURE: 128 MMHG | DIASTOLIC BLOOD PRESSURE: 84 MMHG | WEIGHT: 118.4 LBS | TEMPERATURE: 99.3 F | HEART RATE: 82 BPM | RESPIRATION RATE: 16 BRPM | OXYGEN SATURATION: 99 % | BODY MASS INDEX: 21.66 KG/M2

## 2025-08-11 DIAGNOSIS — R09.89 THROAT FULLNESS: ICD-10-CM

## 2025-08-11 DIAGNOSIS — T78.2XXA ANAPHYLAXIS, INITIAL ENCOUNTER: Primary | ICD-10-CM

## 2025-08-11 PROCEDURE — 3074F SYST BP LT 130 MM HG: CPT | Performed by: STUDENT IN AN ORGANIZED HEALTH CARE EDUCATION/TRAINING PROGRAM

## 2025-08-11 PROCEDURE — 82784 ASSAY IGA/IGD/IGG/IGM EACH: CPT | Performed by: STUDENT IN AN ORGANIZED HEALTH CARE EDUCATION/TRAINING PROGRAM

## 2025-08-11 PROCEDURE — 82785 ASSAY OF IGE: CPT | Performed by: STUDENT IN AN ORGANIZED HEALTH CARE EDUCATION/TRAINING PROGRAM

## 2025-08-11 PROCEDURE — 83088 ASSAY OF HISTAMINE: CPT | Mod: 90 | Performed by: STUDENT IN AN ORGANIZED HEALTH CARE EDUCATION/TRAINING PROGRAM

## 2025-08-11 PROCEDURE — 3079F DIAST BP 80-89 MM HG: CPT | Performed by: STUDENT IN AN ORGANIZED HEALTH CARE EDUCATION/TRAINING PROGRAM

## 2025-08-11 PROCEDURE — 99215 OFFICE O/P EST HI 40 MIN: CPT | Performed by: STUDENT IN AN ORGANIZED HEALTH CARE EDUCATION/TRAINING PROGRAM

## 2025-08-11 PROCEDURE — 99000 SPECIMEN HANDLING OFFICE-LAB: CPT | Performed by: STUDENT IN AN ORGANIZED HEALTH CARE EDUCATION/TRAINING PROGRAM

## 2025-08-11 PROCEDURE — 36415 COLL VENOUS BLD VENIPUNCTURE: CPT | Performed by: STUDENT IN AN ORGANIZED HEALTH CARE EDUCATION/TRAINING PROGRAM

## 2025-08-11 RX ORDER — DEXAMETHASONE SODIUM PHOSPHATE 10 MG/ML
10 INJECTION INTRAMUSCULAR; INTRAVENOUS ONCE
Status: COMPLETED | OUTPATIENT
Start: 2025-08-11 | End: 2025-08-11

## 2025-08-11 RX ADMIN — DEXAMETHASONE SODIUM PHOSPHATE 10 MG: 10 INJECTION INTRAMUSCULAR; INTRAVENOUS at 13:56

## 2025-08-12 ENCOUNTER — TELEPHONE (OUTPATIENT)
Dept: ALLERGY | Facility: CLINIC | Age: 20
End: 2025-08-12
Payer: COMMERCIAL

## 2025-08-12 DIAGNOSIS — K50.10 CROHN'S DISEASE OF COLON WITHOUT COMPLICATION (H): ICD-10-CM

## 2025-08-12 LAB
IGA SERPL-MCNC: 134 MG/DL (ref 84–499)
IGE SERPL-ACNC: 36 KU/L (ref 0–114)
IGG SERPL-MCNC: 1250 MG/DL (ref 610–1616)
IGM SERPL-MCNC: 175 MG/DL (ref 35–242)

## 2025-08-13 ENCOUNTER — HOME CARE VISIT (OUTPATIENT)
Dept: HOME HEALTH SERVICES | Facility: HOME HEALTH | Age: 20
End: 2025-08-13
Payer: COMMERCIAL

## 2025-08-13 VITALS
TEMPERATURE: 98 F | OXYGEN SATURATION: 99 % | RESPIRATION RATE: 16 BRPM | HEART RATE: 82 BPM | SYSTOLIC BLOOD PRESSURE: 92 MMHG | DIASTOLIC BLOOD PRESSURE: 64 MMHG

## 2025-08-13 PROCEDURE — 99601 HOME NFS VISIT <2 HRS: CPT

## 2025-08-13 RX ORDER — ONDANSETRON 4 MG/1
TABLET, ORALLY DISINTEGRATING ORAL
Qty: 12 TABLET | Refills: 3 | OUTPATIENT
Start: 2025-08-13

## 2025-08-14 LAB
CREAT UR-MCNC: 206 MG/DL
HISTAMINE 24H UR-MRATE: NORMAL UG/D
HISTAMINE UR-SCNC: 197 NMOL/L
HISTAMINE/CREAT 24H UR-SRTO: 96 NMOL/G CRT

## 2025-08-14 PROCEDURE — S9374 HIT HYDRA 1 LITER DIEM: HCPCS

## 2025-08-19 ENCOUNTER — HOME INFUSION (OUTPATIENT)
Dept: HOME HEALTH SERVICES | Facility: HOME HEALTH | Age: 20
End: 2025-08-19
Payer: COMMERCIAL

## 2025-08-19 ENCOUNTER — HOME INFUSION BILLING (OUTPATIENT)
Dept: HOME HEALTH SERVICES | Facility: HOME HEALTH | Age: 20
End: 2025-08-19
Payer: COMMERCIAL

## 2025-08-19 PROCEDURE — S1015 IV TUBING EXTENSION SET: HCPCS

## 2025-08-20 ENCOUNTER — HOME CARE VISIT (OUTPATIENT)
Dept: HOME HEALTH SERVICES | Facility: HOME HEALTH | Age: 20
End: 2025-08-20
Payer: COMMERCIAL

## 2025-08-20 VITALS
OXYGEN SATURATION: 97 % | RESPIRATION RATE: 16 BRPM | TEMPERATURE: 98.6 F | HEART RATE: 77 BPM | DIASTOLIC BLOOD PRESSURE: 70 MMHG | SYSTOLIC BLOOD PRESSURE: 112 MMHG

## 2025-08-20 PROCEDURE — 99601 HOME NFS VISIT <2 HRS: CPT

## 2025-08-20 PROCEDURE — S9374 HIT HYDRA 1 LITER DIEM: HCPCS

## 2025-08-27 ENCOUNTER — HOME CARE VISIT (OUTPATIENT)
Dept: HOME HEALTH SERVICES | Facility: HOME HEALTH | Age: 20
End: 2025-08-27
Payer: COMMERCIAL

## 2025-08-27 VITALS
OXYGEN SATURATION: 98 % | TEMPERATURE: 98.6 F | SYSTOLIC BLOOD PRESSURE: 110 MMHG | HEART RATE: 98 BPM | RESPIRATION RATE: 18 BRPM | DIASTOLIC BLOOD PRESSURE: 62 MMHG

## 2025-08-27 PROCEDURE — S9374 HIT HYDRA 1 LITER DIEM: HCPCS

## 2025-08-27 PROCEDURE — 99601 HOME NFS VISIT <2 HRS: CPT

## 2025-09-01 ENCOUNTER — MYC MEDICAL ADVICE (OUTPATIENT)
Dept: INTERNAL MEDICINE | Facility: CLINIC | Age: 20
End: 2025-09-01
Payer: COMMERCIAL

## 2025-09-03 ENCOUNTER — VIRTUAL VISIT (OUTPATIENT)
Dept: VASCULAR SURGERY | Facility: CLINIC | Age: 20
End: 2025-09-03
Payer: COMMERCIAL

## 2025-09-03 VITALS — HEIGHT: 62 IN | WEIGHT: 118 LBS | BODY MASS INDEX: 21.71 KG/M2

## 2025-09-03 DIAGNOSIS — Z91.89 AT RISK FOR DEHYDRATION DUE TO POOR FLUID INTAKE: ICD-10-CM

## 2025-09-03 DIAGNOSIS — E61.1 IRON DEFICIENCY: ICD-10-CM

## 2025-09-03 DIAGNOSIS — R63.8 POOR FLUID INTAKE: Primary | ICD-10-CM

## 2025-09-03 DIAGNOSIS — E86.1 HYPOTENSION DUE TO HYPOVOLEMIA: ICD-10-CM

## 2025-09-03 PROCEDURE — S9374 HIT HYDRA 1 LITER DIEM: HCPCS

## 2025-09-03 ASSESSMENT — PAIN SCALES - GENERAL: PAINLEVEL_OUTOF10: NO PAIN (0)

## 2025-09-04 ENCOUNTER — HOME CARE VISIT (OUTPATIENT)
Dept: HOME HEALTH SERVICES | Facility: HOME HEALTH | Age: 20
End: 2025-09-04
Payer: COMMERCIAL

## 2025-09-04 VITALS
HEART RATE: 74 BPM | DIASTOLIC BLOOD PRESSURE: 78 MMHG | TEMPERATURE: 98.4 F | RESPIRATION RATE: 16 BRPM | SYSTOLIC BLOOD PRESSURE: 120 MMHG | OXYGEN SATURATION: 98 %

## (undated) DEVICE — SOL WATER IRRIG 1000ML BOTTLE 2F7114

## (undated) DEVICE — GLOVE PROTEXIS W/NEU-THERA 7.5  2D73TE75

## (undated) DEVICE — ENDO FORCEP ENDOJAW BIOPSY 2.8MMX230CM FB-220U

## (undated) DEVICE — SPECIMEN CONTAINER W/20ML 10% BUFF FORMALIN C4322-11

## (undated) DEVICE — PAD CHUX UNDERPAD 30X36" P3036C

## (undated) DEVICE — TUBING SUCTION MEDI-VAC 1/4"X20' N620A

## (undated) DEVICE — PACK SET-UP STD 9102

## (undated) DEVICE — NDL 25GA 1.5" 305127

## (undated) DEVICE — KIT ENDO TURNOVER/PROCEDURE CARRY-ON 101822

## (undated) DEVICE — SOL WATER IRRIG 1000ML BOTTLE 07139-09

## (undated) DEVICE — SUCTION MANIFOLD NEPTUNE 2 SYS 4 PORT 0702-020-000

## (undated) DEVICE — NDL 19GA 1.5"

## (undated) DEVICE — KIT CONNECTOR FOR OLYMPUS ENDOSCOPES DEFENDO 100310

## (undated) DEVICE — GOWN XLG DISP 9545

## (undated) DEVICE — SYR 10ML FINGER CONTROL W/O NDL 309695

## (undated) DEVICE — SUCTION MANIFOLD DORNOCH ULTRA CART UL-CL500

## (undated) DEVICE — ESU ELEC BLADE 2.75" COATED/INSULATED E1455

## (undated) DEVICE — SUCTION CANISTER MEDIVAC LINER 1500ML W/LID 65651-515

## (undated) DEVICE — TUBING SUCTION 10'X3/16" N510

## (undated) DEVICE — ESU PENCIL W/HOLSTER

## (undated) DEVICE — TUBING ENDOGATOR HYBRID IRRIG 100610 EGP-100

## (undated) DEVICE — SUCTION TIP YANKAUER W/O VENT K86

## (undated) DEVICE — WIPE PREMOIST CLEANSING WASHCLOTHS 7988

## (undated) DEVICE — ENDO CAMERA PILLCAM CAPSULE 12HR SB3-EX FGS-0499

## (undated) DEVICE — DRAPE SHEET HALF 40X60" 9358

## (undated) DEVICE — MARKER SKIN DOUBLE TIP W/FLEXI-RULER W/LABELS

## (undated) DEVICE — ENDO BITE BLOCK PEDS BATRIK LATEX FREE B1

## (undated) DEVICE — ANTIFOG SOLUTION W/FOAM PAD CF-1001

## (undated) DEVICE — ESU SUCTION CAUTERY 10FR FOOT CONTROL E2505-10FR

## (undated) DEVICE — BASIN SET MINOR DISP

## (undated) DEVICE — ESU GROUND PAD ADULT W/CORD E7507

## (undated) RX ORDER — PROPOFOL 10 MG/ML
INJECTION, EMULSION INTRAVENOUS
Status: DISPENSED
Start: 2020-12-16

## (undated) RX ORDER — GABAPENTIN 300 MG/1
CAPSULE ORAL
Status: DISPENSED
Start: 2019-12-19

## (undated) RX ORDER — ONDANSETRON 2 MG/ML
INJECTION INTRAMUSCULAR; INTRAVENOUS
Status: DISPENSED
Start: 2019-12-19

## (undated) RX ORDER — DEXAMETHASONE SODIUM PHOSPHATE 4 MG/ML
INJECTION, SOLUTION INTRA-ARTICULAR; INTRALESIONAL; INTRAMUSCULAR; INTRAVENOUS; SOFT TISSUE
Status: DISPENSED
Start: 2019-12-19

## (undated) RX ORDER — FENTANYL CITRATE 50 UG/ML
INJECTION, SOLUTION INTRAMUSCULAR; INTRAVENOUS
Status: DISPENSED
Start: 2021-02-03

## (undated) RX ORDER — GINSENG 100 MG
CAPSULE ORAL
Status: DISPENSED
Start: 2019-12-19

## (undated) RX ORDER — FENTANYL CITRATE 50 UG/ML
INJECTION, SOLUTION INTRAMUSCULAR; INTRAVENOUS
Status: DISPENSED
Start: 2019-12-19

## (undated) RX ORDER — FENTANYL CITRATE 50 UG/ML
INJECTION, SOLUTION INTRAMUSCULAR; INTRAVENOUS
Status: DISPENSED
Start: 2020-12-16

## (undated) RX ORDER — ACETAMINOPHEN 325 MG/1
TABLET ORAL
Status: DISPENSED
Start: 2019-12-19

## (undated) RX ORDER — DIPHENHYDRAMINE HYDROCHLORIDE 50 MG/ML
INJECTION INTRAMUSCULAR; INTRAVENOUS
Status: DISPENSED
Start: 2019-12-19

## (undated) RX ORDER — OXYCODONE HYDROCHLORIDE 5 MG/1
TABLET ORAL
Status: DISPENSED
Start: 2019-12-19